# Patient Record
Sex: MALE | Race: WHITE | NOT HISPANIC OR LATINO | Employment: FULL TIME | URBAN - NONMETROPOLITAN AREA
[De-identification: names, ages, dates, MRNs, and addresses within clinical notes are randomized per-mention and may not be internally consistent; named-entity substitution may affect disease eponyms.]

---

## 2017-01-27 ENCOUNTER — APPOINTMENT (OUTPATIENT)
Dept: LAB | Facility: HOSPITAL | Age: 37
End: 2017-01-27
Attending: PHYSICIAN ASSISTANT

## 2017-01-27 PROCEDURE — 36415 COLL VENOUS BLD VENIPUNCTURE: CPT | Performed by: PHYSICIAN ASSISTANT

## 2017-01-27 PROCEDURE — 84443 ASSAY THYROID STIM HORMONE: CPT | Performed by: PHYSICIAN ASSISTANT

## 2017-05-12 ENCOUNTER — COMMUNICATION - GICH (OUTPATIENT)
Dept: FAMILY MEDICINE | Facility: OTHER | Age: 37
End: 2017-05-12

## 2017-06-26 ENCOUNTER — HISTORY (OUTPATIENT)
Dept: FAMILY MEDICINE | Facility: OTHER | Age: 37
End: 2017-06-26

## 2017-06-26 ENCOUNTER — OFFICE VISIT - GICH (OUTPATIENT)
Dept: FAMILY MEDICINE | Facility: OTHER | Age: 37
End: 2017-06-26

## 2017-06-26 DIAGNOSIS — L30.9 DERMATITIS: ICD-10-CM

## 2017-06-26 DIAGNOSIS — Z13.220 ENCOUNTER FOR SCREENING FOR LIPOID DISORDERS: ICD-10-CM

## 2017-06-26 DIAGNOSIS — E66.01 MORBID (SEVERE) OBESITY DUE TO EXCESS CALORIES (H): ICD-10-CM

## 2017-06-26 DIAGNOSIS — E31.22 MULTIPLE ENDOCRINE NEOPLASIA (MEN) TYPE IIA (H): ICD-10-CM

## 2017-06-26 DIAGNOSIS — C73 MALIGNANT NEOPLASM OF THYROID GLAND (H): ICD-10-CM

## 2017-06-26 DIAGNOSIS — K74.69 OTHER CIRRHOSIS OF LIVER (H): ICD-10-CM

## 2017-06-26 DIAGNOSIS — Z00.00 ENCOUNTER FOR GENERAL ADULT MEDICAL EXAMINATION WITHOUT ABNORMAL FINDINGS: ICD-10-CM

## 2017-06-26 LAB
ANION GAP - HISTORICAL: 6 (ref 5–18)
BUN SERPL-MCNC: 6 MG/DL (ref 7–25)
BUN/CREAT RATIO - HISTORICAL: 9
CALCIUM SERPL-MCNC: 8.4 MG/DL (ref 8.6–10.3)
CHLORIDE SERPLBLD-SCNC: 105 MMOL/L (ref 98–107)
CHOL/HDL RATIO - HISTORICAL: 3.72
CHOLESTEROL TOTAL: 108 MG/DL
CO2 SERPL-SCNC: 24 MMOL/L (ref 21–31)
CREAT SERPL-MCNC: 0.69 MG/DL (ref 0.7–1.3)
GFR IF NOT AFRICAN AMERICAN - HISTORICAL: >60 ML/MIN/1.73M2
GLUCOSE SERPL-MCNC: 348 MG/DL (ref 70–105)
HDLC SERPL-MCNC: 29 MG/DL (ref 23–92)
LDLC SERPL CALC-MCNC: 47 MG/DL
NON-HDL CHOLESTEROL - HISTORICAL: 79 MG/DL
PATIENT STATUS - HISTORICAL: ABNORMAL
POTASSIUM SERPL-SCNC: 3.8 MMOL/L (ref 3.5–5.1)
SODIUM SERPL-SCNC: 135 MMOL/L (ref 133–143)
TRIGL SERPL-MCNC: 162 MG/DL
TSH - HISTORICAL: 1.63 UIU/ML (ref 0.34–5.6)

## 2017-06-26 ASSESSMENT — ANXIETY QUESTIONNAIRES
3. WORRYING TOO MUCH ABOUT DIFFERENT THINGS: NOT AT ALL
4. TROUBLE RELAXING: NOT AT ALL
1. FEELING NERVOUS, ANXIOUS, OR ON EDGE: NOT AT ALL
GAD7 TOTAL SCORE: 0
7. FEELING AFRAID AS IF SOMETHING AWFUL MIGHT HAPPEN: NOT AT ALL
2. NOT BEING ABLE TO STOP OR CONTROL WORRYING: NOT AT ALL
6. BECOMING EASILY ANNOYED OR IRRITABLE: NOT AT ALL
5. BEING SO RESTLESS THAT IT IS HARD TO SIT STILL: NOT AT ALL

## 2017-06-26 ASSESSMENT — PATIENT HEALTH QUESTIONNAIRE - PHQ9: SUM OF ALL RESPONSES TO PHQ QUESTIONS 1-9: 0

## 2017-06-29 ENCOUNTER — OFFICE VISIT - GICH (OUTPATIENT)
Dept: FAMILY MEDICINE | Facility: OTHER | Age: 37
End: 2017-06-29

## 2017-06-29 ENCOUNTER — HISTORY (OUTPATIENT)
Dept: FAMILY MEDICINE | Facility: OTHER | Age: 37
End: 2017-06-29

## 2017-06-29 DIAGNOSIS — E11.9 TYPE 2 DIABETES MELLITUS WITHOUT COMPLICATIONS (H): ICD-10-CM

## 2017-06-29 LAB
CREATININE RANDOM URINE: 258.8 MG/DL (ref 63–166)
ESTIMATED AVERAGE GLUCOSE: 123 MG/DL
HEMOGLOBIN A1C MONITORING (POCT) - HISTORICAL: 5.9 % (ref 4–6.2)
PROT/CREAT RATIO,UR - HISTORICAL: 0.1
PROTEIN QUANT,RAND URINE - HISTORICAL: 21 MG/DL (ref 1–14)

## 2017-06-29 ASSESSMENT — PATIENT HEALTH QUESTIONNAIRE - PHQ9: SUM OF ALL RESPONSES TO PHQ QUESTIONS 1-9: 0

## 2017-07-18 ENCOUNTER — AMBULATORY - GICH (OUTPATIENT)
Dept: EDUCATION SERVICES | Facility: OTHER | Age: 37
End: 2017-07-18

## 2017-07-18 DIAGNOSIS — E11.9 TYPE 2 DIABETES MELLITUS WITHOUT COMPLICATIONS (H): ICD-10-CM

## 2017-07-19 ENCOUNTER — COMMUNICATION - GICH (OUTPATIENT)
Dept: FAMILY MEDICINE | Facility: OTHER | Age: 37
End: 2017-07-19

## 2017-07-19 DIAGNOSIS — E11.9 TYPE 2 DIABETES MELLITUS WITHOUT COMPLICATIONS (H): ICD-10-CM

## 2017-08-07 ENCOUNTER — HISTORY (OUTPATIENT)
Dept: EMERGENCY MEDICINE | Facility: OTHER | Age: 37
End: 2017-08-07

## 2017-09-17 ENCOUNTER — HISTORY (OUTPATIENT)
Dept: EMERGENCY MEDICINE | Facility: OTHER | Age: 37
End: 2017-09-17

## 2017-09-18 ENCOUNTER — AMBULATORY - GICH (OUTPATIENT)
Dept: SCHEDULING | Facility: OTHER | Age: 37
End: 2017-09-18

## 2017-09-19 ENCOUNTER — AMBULATORY - GICH (OUTPATIENT)
Dept: SCHEDULING | Facility: OTHER | Age: 37
End: 2017-09-19

## 2017-09-22 ENCOUNTER — COMMUNICATION - GICH (OUTPATIENT)
Dept: FAMILY MEDICINE | Facility: OTHER | Age: 37
End: 2017-09-22

## 2017-09-22 DIAGNOSIS — Z13.220 ENCOUNTER FOR SCREENING FOR LIPOID DISORDERS: ICD-10-CM

## 2017-09-22 DIAGNOSIS — E31.22 MULTIPLE ENDOCRINE NEOPLASIA (MEN) TYPE IIA (H): ICD-10-CM

## 2017-09-22 DIAGNOSIS — E11.9 TYPE 2 DIABETES MELLITUS WITHOUT COMPLICATIONS (H): ICD-10-CM

## 2017-10-17 ENCOUNTER — OFFICE VISIT - GICH (OUTPATIENT)
Dept: FAMILY MEDICINE | Facility: OTHER | Age: 37
End: 2017-10-17

## 2017-10-17 ENCOUNTER — HISTORY (OUTPATIENT)
Dept: FAMILY MEDICINE | Facility: OTHER | Age: 37
End: 2017-10-17

## 2017-10-17 DIAGNOSIS — B07.0 PLANTAR WART: ICD-10-CM

## 2017-10-23 ENCOUNTER — COMMUNICATION - GICH (OUTPATIENT)
Dept: FAMILY MEDICINE | Facility: OTHER | Age: 37
End: 2017-10-23

## 2017-10-23 DIAGNOSIS — E11.9 TYPE 2 DIABETES MELLITUS WITHOUT COMPLICATIONS (H): ICD-10-CM

## 2017-10-27 ENCOUNTER — COMMUNICATION - GICH (OUTPATIENT)
Dept: FAMILY MEDICINE | Facility: OTHER | Age: 37
End: 2017-10-27

## 2017-10-31 ENCOUNTER — AMBULATORY - GICH (OUTPATIENT)
Dept: SCHEDULING | Facility: OTHER | Age: 37
End: 2017-10-31

## 2017-11-03 ENCOUNTER — COMMUNICATION - GICH (OUTPATIENT)
Dept: LAB | Facility: OTHER | Age: 37
End: 2017-11-03

## 2017-11-03 DIAGNOSIS — E11.9 TYPE 2 DIABETES MELLITUS WITHOUT COMPLICATIONS (H): ICD-10-CM

## 2017-11-09 ENCOUNTER — AMBULATORY - GICH (OUTPATIENT)
Dept: SCHEDULING | Facility: OTHER | Age: 37
End: 2017-11-09

## 2017-11-21 ENCOUNTER — HISTORY (OUTPATIENT)
Dept: FAMILY MEDICINE | Facility: OTHER | Age: 37
End: 2017-11-21

## 2017-11-21 ENCOUNTER — OFFICE VISIT - GICH (OUTPATIENT)
Dept: FAMILY MEDICINE | Facility: OTHER | Age: 37
End: 2017-11-21

## 2017-11-21 DIAGNOSIS — B07.0 PLANTAR WART: ICD-10-CM

## 2017-12-27 NOTE — PROGRESS NOTES
Patient Information     Patient Name MRN Sex     Jason Suarez [id number] Male 1980      Progress Notes by Dg Silva MD at 10/17/2017  4:15 PM     Author:  Dg Silva MD Service:  (none) Author Type:  Physician     Filed:  10/17/2017  4:37 PM Encounter Date:  10/17/2017 Status:  Signed     :  Dg Silva MD (Physician)            SUBJECTIVE:    Jason Suarez is a 37 y.o. male who presents for plantar warts    HPI    Right foot with several warts for at least a year.  No previous treatment at all.  No pain unless he is on a long walk and shoes are too tight.  Intentionally losing weight.      No Known Allergies,   Current Outpatient Prescriptions on File Prior to Visit       Medication  Sig Dispense Refill     aspirin (ECOTRIN) 81 mg enteric coated tablet Take 1 tablet by mouth once daily with a meal.  0     cholecalciferol (VITAMIN D) 1,000 unit capsule Take 1,000 Units by mouth once daily.       FOLIC ACID/MULTIVIT-MIN/LUTEIN (CENTRUM SILVER ORAL) Take 1 Tab by mouth once daily.       levothyroxine (SYNTHROID) 175 mcg tablet Take 1 tablet by mouth before breakfast. 90 tablet 0     lisinopril (PRINIVIL; ZESTRIL) 10 mg tablet Take 1 tablet by mouth once daily. 90 tablet 3     metFORMIN (GLUCOPHAGE) 500 mg tablet TAKE 1 TABLET BY MOUTH TWICE DAILY WITH MEALS 60 tablet 0     triamcinolone 0.5% (ARISTOCORT) 0.5 % cream Apply  topically to affected area(s) 3 times daily. 30 g 2     vitamin e 400 unit capsule Take 800 Units by mouth once daily.       No current facility-administered medications on file prior to visit.    ,   Past Medical History:     Diagnosis  Date     Acute pancreatitis     H/O gallbladder pancreatitis      Bilateral arm fractures     Bilateral arm fractures as a child      Closed left ankle fracture      Esophageal reflux      Malignant neoplasm of thyroid gland (HC)      Multiple endocrine neoplasia (MEN) type IIA      Obesity, unspecified      Umbilical hernia  without mention of obstruction or gangrene     hernia repair 7/10/14     and   Past Surgical History:      Procedure  Laterality Date     ANKLE FRACTURE TREATMENT      ORIF lt ankle        LAP CHOLECYSTECTOMY  7/10/14    Cholecystectomy,Laparoscopic, liver biopsy, [facility] without mesh       REMOVE      and removal of syndesmotic screw.       THYROIDECTOMY      2010         REVIEW OF SYSTEMS:  Review of Systems   Constitutional: Positive for weight loss.   Skin: Negative for itching and rash.   Psychiatric/Behavioral: Negative for substance abuse.       OBJECTIVE:  /72  Resp 16  Wt (!) 159 kg (350 lb 9.6 oz)  BMI 47.55 kg/m2    EXAM:   Physical Exam   Constitutional: He is oriented to person, place, and time and well-developed, well-nourished, and in no distress. No distress.   Neurological: He is alert and oriented to person, place, and time.   Skin: He is not diaphoretic.   Right foot with verucous lesions, on lateral 5th toe, inferior distal 4 th toe, plantar 1st MTP.  Larges is 1.5 cm in diameter.  All treatment with 3 cycles of cryo.       ASSESSMENT/PLAN:    ICD-10-CM    1. Plantar wart B07.0 AL DESTROY BENIGN LESIONS UP TO 14 LESIONS        Plan:  Can try OTC compoundW, or follow up in 3 weeks rule out another round of cryo.    Dg Silva MD ....................  10/17/2017   4:37 PM

## 2017-12-28 NOTE — PROGRESS NOTES
Patient Information     Patient Name MRN Sex     Jason Suarez [id number] Male 1980      Progress Notes by Antonio Wolff MD at 2017  4:00 PM     Author:  Antonio Wolff MD Service:  (none) Author Type:  Physician     Filed:  2017  8:32 AM Encounter Date:  2017 Status:  Signed     :  Antonio Wolff MD (Physician)            SUBJECTIVE:    Jason Suarez is a 37 y.o. male who presents for new onset diabetes    HPI Comments: Patient arrives here with a recent blood sugar of over 300. He does not have a history of diabetes nor a diagnosis in the past.      No Known Allergies,   Family History       Problem   Relation Age of Onset     Cancer  Father      Thyroid cancer.     ,   Current Outpatient Prescriptions on File Prior to Visit       Medication  Sig Dispense Refill     levothyroxine (SYNTHROID) 175 mcg tablet Take 1 tablet by mouth once daily. 90 tablet 0     triamcinolone 0.5% (ARISTOCORT) 0.5 % cream Apply  topically to affected area(s) 3 times daily. 30 g 2     No current facility-administered medications on file prior to visit.    ,   Past Surgical History:      Procedure  Laterality Date     ANKLE FRACTURE TREATMENT      ORIF lt ankle        LAP CHOLECYSTECTOMY  7/10/14    Cholecystectomy,Laparoscopic, liver biopsy, [facility] without mesh       REMOVE      and removal of syndesmotic screw.       THYROIDECTOMY           ,   Social History      Substance Use Topics        Smoking status:  Former Smoker     Packs/day: 1.00     Years: 12.     Types: Cigarettes     Quit date: 2015     Smokeless tobacco:  Never Used     Alcohol use  No    and   Social History      Substance Use Topics        Smoking status:  Former Smoker     Packs/day: 1.00     Years: 12.     Types: Cigarettes     Quit date: 2015     Smokeless tobacco:  Never Used     Alcohol use  No       REVIEW OF SYSTEMS:  ROS    OBJECTIVE:  /90  Pulse 80  Wt (!) 169.4 kg (373 lb 6.4 oz)  BMI 50.64  kg/m2    EXAM:   Physical Exam   Constitutional:   Obese   HENT:   Head: Normocephalic.   Cardiovascular: Normal rate and regular rhythm.      Results for orders placed or performed in visit on 06/29/17       Hgb A1c       Result  Value Ref Range Status    HEMOGLOBIN A1C MONITORING (POCT) 5.9 4.0 - 6.2 % Final    ESTIMATED AVERAGE GLUCOSE  123 mg/dL Final   URINE MICROALBUMIN/CREATININE RATIO       Result  Value Ref Range Status    PROTEIN QUANT,RAND URINE 21 (H) 1 - 14 mg/dL Final    CREAT,RANDOM URINE 258.8 (H) 63.0 - 166.0 mg/dL Final    PROT/CREAT RATIO,UR       0.1  Final         ASSESSMENT/PLAN:    ICD-10-CM    1. Type 2 diabetes mellitus without complication, without long-term current use of insulin (HC) E11.9 metFORMIN (GLUCOPHAGE) 500 mg tablet      aspirin (ECOTRIN) 81 mg enteric coated tablet      lisinopril (PRINIVIL; ZESTRIL) 10 mg tablet      Hgb A1c      URINE MICROALBUMIN/CREATININE RATIO      AMB CONSULT TO DIABETES EDUCATION      Hgb A1c      URINE MICROALBUMIN/CREATININE RATIO        Plan:    Diagnoses diabetes.  I discussed the definition.  Discussed medications including lisinopril met metformin and aspirin. His LDL is excellent we will hold off on a statin. Patient does not wish to take it especially having an excellent recent LDL.  Also discussed the need for diabetic teaching blood sugar monitoring. He will get set up with our diabetic educator.  Greater than 50% of time discussing diabetes. Complications. Vacation. And the need for education. 25-30 minutes spent.

## 2017-12-28 NOTE — TELEPHONE ENCOUNTER
Patient Information     Patient Name MRN Sex Jason Clancy [id number] Male 1980      Telephone Encounter by Cynthia Sutherland RN at 2017  8:17 AM     Author:  Cynthia Sutherland RN Service:  (none) Author Type:  NURS- Registered Nurse     Filed:  2017  8:24 AM Encounter Date:  2017 Status:  Signed     :  Cynthia Sutherland RN (NURS- Registered Nurse)            Biguanides    Office visit in the past 12 months or per provider note.    Last visit with GELACIO MARAVILLA was on: 2017 in [facility]  Next visit with GELACIO MARAVILLA is on: No future appointment listed with this provider  Next visit with Family Practice is on: No future appointment listed in this department    Lab test requirements:  HgbA1c annually or per provider note.  HEMOGLOBIN A1C MONITORING (POCT) (%)    Date Value   2017 5.9       Max refill for 12 months from last office visit or per provider note.    Per diabetic education PT to have A1C in September.     Prescription refilled per RN Medication Refill Policy.................... Cynthia Sutherland RN ....................  2017   8:21 AM

## 2017-12-28 NOTE — TELEPHONE ENCOUNTER
Patient Information     Patient Name MRN Sex Jason Clancy [id number] Male 1980      Telephone Encounter by Soledad Callejas at 11/3/2017  9:42 AM     Author:  Soledad Callejas Service:  (none) Author Type:  (none)     Filed:  11/3/2017  9:42 AM Encounter Date:  11/3/2017 Status:  Signed     :  Soledad Callejas            Patient is coming for labs. Please place orders.

## 2017-12-28 NOTE — PROGRESS NOTES
Patient Information     Patient Name MRN Sex     Jason Suarez [id number] Male 1980      Progress Notes by Dg Silva MD at 2017  4:15 PM     Author:  Dg Silva MD Service:  (none) Author Type:  Physician     Filed:  2017  4:30 PM Encounter Date:  2017 Status:  Signed     :  Dg Silva MD (Physician)            SUBJECTIVE:    Jason Suarez is a 37 y.o. male who presents for follow up plantar wart treatment     HPI    Last treatment only slightly helped.  Has no pain from them.  Would like another round.    No Known Allergies,   Current Outpatient Prescriptions on File Prior to Visit       Medication  Sig Dispense Refill     aspirin (ECOTRIN) 81 mg enteric coated tablet Take 1 tablet by mouth once daily with a meal.  0     cholecalciferol (VITAMIN D) 1,000 unit capsule Take 1,000 Units by mouth once daily.       FOLIC ACID/MULTIVIT-MIN/LUTEIN (CENTRUM SILVER ORAL) Take 1 Tab by mouth once daily.       furosemide (LASIX) 20 mg tablet Take 20 mg by mouth once daily.       lisinopril (PRINIVIL; ZESTRIL) 10 mg tablet Take 1 tablet by mouth once daily. 90 tablet 3     metFORMIN (GLUCOPHAGE) 500 mg tablet TAKE 1 TABLET BY MOUTH TWICE DAILY WITH MEALS 60 tablet 0     triamcinolone 0.5% (ARISTOCORT) 0.5 % cream Apply  topically to affected area(s) 3 times daily. 30 g 2     vitamin e 400 unit capsule Take 800 Units by mouth once daily.       No current facility-administered medications on file prior to visit.    ,   Past Medical History:     Diagnosis  Date     Acute pancreatitis     H/O gallbladder pancreatitis      Bilateral arm fractures     Bilateral arm fractures as a child      Closed left ankle fracture      Esophageal reflux      Malignant neoplasm of thyroid gland (HC)      Multiple endocrine neoplasia (MEN) type IIA      Obesity, unspecified      Umbilical hernia without mention of obstruction or gangrene     hernia repair 7/10/14     and   Past Surgical History:       Procedure  Laterality Date     ANKLE FRACTURE TREATMENT      ORIF lt ankle        LAP CHOLECYSTECTOMY  7/10/14    Cholecystectomy,Laparoscopic, liver biopsy, [facility] without mesh       REMOVE      and removal of syndesmotic screw.       THYROIDECTOMY      2010         REVIEW OF SYSTEMS:  Review of Systems   Skin: Negative for itching and rash.       OBJECTIVE:  /60  Pulse 84  Wt (!) 161.5 kg (356 lb)  BMI 48.28 kg/m2    EXAM:   Physical Exam   Constitutional: He is oriented to person, place, and time and well-developed, well-nourished, and in no distress. No distress.   Neurological: He is alert and oriented to person, place, and time.   Skin: He is not diaphoretic.   Right foot with many, perhaps 1-, verrucous lesions from lateral 5th toe to plantar 1st toe.  Largest is about 7 mm or so, smallest 2 mm.  All treated with 3 cycles of cryo.       ASSESSMENT/PLAN:    ICD-10-CM    1. Plantar wart B07.0 VA DESTROY BENIGN LESIONS UP TO 14 LESIONS        Plan:  Can repeat this again in 3 or more weeks.  If not responding, either can live with the symptoms, use OTC topical treatment or consider candida injections.    Dg Silva MD ....................  11/21/2017   4:29 PM

## 2017-12-28 NOTE — TELEPHONE ENCOUNTER
Patient Information     Patient Name MRN Jason Merida [id number] Male 1980      Telephone Encounter by Tyesha Gudino LPN at 11/3/2017 10:01 AM     Author:  Tyesha Gudino LPN Service:  (none) Author Type:  NURS- Licensed Practical Nurse     Filed:  11/3/2017 10:03 AM Encounter Date:  11/3/2017 Status:  Signed     :  Tyesha Gudino LPN (NURS- Licensed Practical Nurse)            Patient is scheduled to come in for a diabetic appointment, please place lab orders.  Tyesha Gudino LPN.........11/3/2017   10:01 AM

## 2017-12-28 NOTE — TELEPHONE ENCOUNTER
Patient Information     Patient Name MRN Jason Merida [id number] Male 1980      Telephone Encounter by Ileana Kee at 10/27/2017  2:13 PM     Author:  Ileana Kee Service:  (none) Author Type:  (none)     Filed:  10/27/2017  3:07 PM Encounter Date:  10/27/2017 Status:  Signed     :  Ileana Kee            Left message with patient. Attempting to find out if patient knows why he is coming in for labs. Previous visits don't indicate a lab apt and PCP is unavailable.  Ileana Kee LPN .............10/27/2017  3:07 PM

## 2017-12-28 NOTE — PROGRESS NOTES
Patient Information     Patient Name MRN Sex     Jason Suarez [id number] Male 1980      Progress Notes by Faith Mcclelland RN at 2017  2:30 PM     Author:  Faith Mcclelland RN Service:  (none) Author Type:  NURS- Registered Nurse     Filed:  2017  4:37 PM Encounter Date:  2017 Status:  Signed     :  Faith Mcclelland RN (NURS- Registered Nurse)            Diabetes Education Progress Note - Individual Education    Referring Medical Provider:  Dr. oWlff    SUBJECTIVE: Jason Suarez is a 37 y.o. male who has diabetes with and is here for Individual Diabetes Education.   Age at diagnosis 37. Family history positive for diabetes in the patient's Mother and Father.   Current treatment includes diet and oral agent.   Current monitoring regimen: none      Individual Assessed Needs Include:  Diabetes Disease Process, Overview of DM, Incorporating Nutrition Management into Lifestyle, Incorporating Physical Activity into Lifestyle, Using Diabetes Medications Safely, Glucose Monitoring and Interpreting and Using Results, Prevention, detection, and treatment of acute complications, Prevention, detection, and treatment of Chronic Complications, Developing Strategies to address Psychosocial Issues and Developing Strategies to promote Health/Change Behavior      Education included: Diabetes Disease Process, Nutrition/Carbohydrate counting, Physical Activity, Diabetes Medications, Hyperglycemia/Hypoglycemia, Chronic Complications, Optimal Diabetes Care, Overview of DM, Psychosocial Strategies and Health/Change Behavior Strategies    Discussed D5 Health Goals and patient has met 5 of 5 goals at this time.  (BP less than 140/90, ASA therapy as recommended, statin therapy as recommended, A1c less than 8%, tobacco free).    Patient does not want to begin checking his BG at this time.  He would like to try meal planning and physical activity, with weight loss to help tighten BG control.  He asks to wait  for follow up HgA1c and FBG lab report before beginning SMBG.        Carbohydrate counting overview and practice. Introduced patient to physical activity and goal setting. Patient's current physical activity habits vary, no regular activity time.  Discussed participation in Diabetes Prevention Program which can aid patient in weight loss.  Contact information given.    Plan Breakfast Snack Lunch Snack Dinner Snack   CHO grams 60  0 - 15 60 0 - 15 60 0 - 15    Meat  5 - 6 oz/day  Fats  4 - 6 servings/day  Vegetables Unlimited   servings/day          Educational Handouts Given:  Diabetes Success Plan, My Food Plan, A1c flier, Activity Pyramid, D5 Health Goals    Patient did verbalize understanding of education as evidenced by stating need to begin meal planning, counting CHO for weight loss and tighter control of BG.     Patient  did demonstrate understanding of education today as evidenced by goal setting.         PLAN:  Patient requests to wait to learn BG testing until after his follow up A1c in September.  Encouraged patient to call if he changes his mind and would like to begin SMBG.  Patient will follow meal plan, practice carbohydrate counting and label reading.  Patient encouraged to develop physical activity plan.  Patient will follow up with provider as directed by PCP    Self-Directed Behavior Goal/s set by patient:  (1) Count carbohydrates at most of my meals and snacks.         Time spent for individual education was 90 minutes.    Faith Mcclelland RN, BSN, CDE  7/18/2017 4:20 PM       DIABETES SELF-MANAGEMENT SUPPORT (DSMS) PLAN    The patient has attended diabetes education sessions. The patient has received a copy of the below DSMS and has chosen to use the following as resources to help manage their diabetes.     DSMS Plan:  (1) Check out apps/web based programs.         The following are resources that will help you manage your diabetes.   Let your educator know if you need help accessing the  websites.    Emotional Support  Diabetes Support Group: Sponsored by [facility]    Meets the 4th Thursday of the month at 6:30    [facility] [place], [address] [place] [address] [place]  Other: ______________________________________________________________________  Weight Management   Weight Watchers     Meets Mondays 9:30, 11:45, or 5:30    Larry Mack, [address] [facility] [place]     Contact Daniella [phone] [email or web address]    Weight Watchers www. weightwatchers.com    My Fitness Pal    Rapp IT Up.Snapguide or download the from [place]iss[place]  Other: ______________________________________________________________________    Exercise   [address]  [phone]  Walking/Biking Trails    Get Fit [place]  ImageBrieffitHASHa.[email or web address]  Other: ______________________________________________________________________    Stress Relief    [facility], [address] [facility], [phone]    [facility], [address] [place] [address] [phone]  Other: ______________________________________________________________________  Journals     Diabetes Forecast- 922.332.4096- www.diabetesforecast.org     Diabetes Self-Management- [phone]- www.diabetesselfmanagement.com     Apps/Web Based Programs    My Fitness Pal  myfitnesspal.com    Glucose [place] glucosebuddy.com  (Free, tracks blood glucose, graphs)     Emeals  emeals.com  (weekly meal plans)    Fooducate.com   (for iphone, ipad, ipod touch, android, & web)    CalorieKing.com  (for iphone, ipad, ipod touch, android, blackberry, & web)    SparkPeople.com  (free tools, resources & support for weight loss, calorie counter, fitness              plans & videos, recipes, etc)    Signed: Patient Name: ________________________________ Date________________________   Communicated to referring provider: _______________________________

## 2017-12-28 NOTE — PATIENT INSTRUCTIONS
Patient Information     Patient Name MRN Jason Merida [id number] Male 1980      Patient Instructions by Faith Mcclelland RN at 2017  2:30 PM     Author:  Faith Mcclelland RN Service:  (none) Author Type:  NURS- Registered Nurse     Filed:  2017  4:08 PM Encounter Date:  2017 Status:  Signed     :  Faith Mcclelland RN (NURS- Registered Nurse)            Diabetes Goals and Reminders  Your A1c test should be done every 3 months.  Your goal is less than 7%.   Your last result is:  HEMOGLOBIN A1C MONITORING (POCT) (%)    Date Value   2017 5.9       Your LDL cholesterol test should be done at least once a year.    Your last result is:  LDL CHOLESTEROL (mg/dL)    Date Value   2017 47       Have Blood pressure and weight checked every three months.  Your blood pressure goal is 140/90 or less.  Your last blood pressure reading was: 130/72      Avoid all tobacco.  Follow your healthy diet and exercise plan.  See the eye doctor every year.  See the dentist every six months.  Have kidney function tested yearly.    Take all medicine as prescribed.  Please let me know if you are having symptoms you don t expect or if you wish to stop any medicine.    Follow Up Plan  Please call or visit Diabetes Education if blood sugars are consistently out of target.  Your future lab plan is:  HgA1c after .    If you need your cholesterol checked at your next appointment, you should fast 8 to 10 hours before your appointment.  Do not eat or drink anything besides water.  Drink plenty of water and take all your usual medicine.    SUMMARY FOR TODAY'S VISIT      1.  Begin carbohydrate counting, 4 choices per meal, 0 - 1 choice per snack (limiting snacks to help with weight loss and tighter blood sugar control).     2.  Recommend low to moderate exercise, such as walking, working up to a minimum of 30 minutes most days, as tolerated.     3.  Follow-up for continued diabetes  education, as needed.  Consider attending the Diabetes Prevention Program at the [facility].  Please call Kristin Klinefelter, Nutritionist for more information [phone].      Faith Mcclelland RN, BSN, CDE  7/18/2017 4:02 PM

## 2017-12-28 NOTE — TELEPHONE ENCOUNTER
Patient Information     Patient Name MRN Sex Jason Clancy [id number] Male 1980      Telephone Encounter by Erin Welsh RN at 2017  2:17 PM     Author:  Erin Welsh RN Service:  (none) Author Type:  NURS- Registered Nurse     Filed:  2017  2:25 PM Encounter Date:  2017 Status:  Signed     :  Erin Welsh RN (NURS- Registered Nurse)            Biguanides    Office visit in the past 12 months or per provider note.    Last visit with GELACIO MARAVILLA was on: 2017 in [facility]  Next visit with GELACIO MARAVILLA is on: No future appointment listed with this provider  Next visit with Family Practice is on: No future appointment listed in this department    Lab test requirements:  HgbA1c annually or per provider note.  HEMOGLOBIN A1C MONITORING (POCT) (%)    Date Value   2017 5.9       Max refill for 12 months from last office visit or per provider note.    If taking for polycystic ovary disease, may refill for 12 months.    Per diabetic education patient to have A1C in September. No upcoming appointment noted. Limited refill provided. Latter and MyChart message sent.    Erin Welsh RN........2017 2:23 PM

## 2017-12-28 NOTE — TELEPHONE ENCOUNTER
Patient Information     Patient Name MRN Jason Merida [id number] Male 1980      Telephone Encounter by Estrella Dinh at 10/27/2017  1:27 PM     Author:  Estrella Dinh Service:  (none) Author Type:  (none)     Filed:  10/27/2017  1:28 PM Encounter Date:  10/27/2017 Status:  Signed     :  Estrella Dinh            This patient is coming for lab on , please place order  Thank you

## 2017-12-28 NOTE — TELEPHONE ENCOUNTER
Patient Information     Patient Name MRN Sex Jason Clancy [id number] Male 1980      Telephone Encounter by Michelle Celaya at 10/31/2017  9:48 AM     Author:  Michelle Celaya Service:  (none) Author Type:  (none)     Filed:  10/31/2017  9:50 AM Encounter Date:  10/27/2017 Status:  Signed     :  Michelle Celaya            Patient is scheduled with diabetic ed on  and then schedule for lab following. Last A1C was in . Michelle Celaya LPN .......................10/31/2017  9:50 AM

## 2017-12-28 NOTE — TELEPHONE ENCOUNTER
Patient Information     Patient Name MRN Sex Jason Clancy [id number] Male 1980      Telephone Encounter by Erin Welsh RN at 10/25/2017  3:24 PM     Author:  Erin Welsh RN Service:  (none) Author Type:  NURS- Registered Nurse     Filed:  10/25/2017  3:34 PM Encounter Date:  10/23/2017 Status:  Signed     :  Erin Welsh RN (NURS- Registered Nurse)            Biguanides    Office visit in the past 12 months or per provider note.    Last visit with GELACIO MARAVILLA was on: 2017 in [facility]  Next visit with GELACIO MARAVILLA is on: No future appointment listed with this provider  Next visit with Family Practice is on: 2017 in [place] [facility]    Lab test requirements:  HgbA1c annually or per provider note.  HEMOGLOBIN A1C MONITORING (POCT) (%)    Date Value   2017 5.9       Max refill for 12 months from last office visit or per provider note.    If taking for polycystic ovary disease, may refill for 12 months.    Patient was advised to follow up with diabetes education for repeat A1c in September. This was not completed. Patient was called today and transferred to scheduling to make this appointment..    Prescription refilled per RN Medication Refill Policy.................... Erin Welsh RN ....................  10/25/2017   3:34 PM

## 2017-12-29 ENCOUNTER — HOSPITAL ENCOUNTER (EMERGENCY)
Facility: HOSPITAL | Age: 37
Discharge: HOME | End: 2017-12-29
Attending: PHYSICIAN ASSISTANT | Admitting: PHYSICIAN ASSISTANT
Payer: COMMERCIAL

## 2017-12-29 VITALS
SYSTOLIC BLOOD PRESSURE: 150 MMHG | RESPIRATION RATE: 20 BRPM | WEIGHT: 315 LBS | OXYGEN SATURATION: 98 % | TEMPERATURE: 97.1 F | HEART RATE: 90 BPM | DIASTOLIC BLOOD PRESSURE: 74 MMHG

## 2017-12-29 DIAGNOSIS — K04.7 DENTAL INFECTION: ICD-10-CM

## 2017-12-29 PROCEDURE — 99213 OFFICE O/P EST LOW 20 MIN: CPT

## 2017-12-29 PROCEDURE — 99202 OFFICE O/P NEW SF 15 MIN: CPT | Performed by: PHYSICIAN ASSISTANT

## 2017-12-29 RX ORDER — AMOXICILLIN 500 MG/1
500 CAPSULE ORAL 3 TIMES DAILY
Qty: 30 CAPSULE | Refills: 0 | Status: SHIPPED | OUTPATIENT
Start: 2017-12-29 | End: 2017-12-29

## 2017-12-29 RX ORDER — PREDNISONE 20 MG/1
40 TABLET ORAL DAILY
Qty: 4 TABLET | Refills: 0 | Status: SHIPPED | OUTPATIENT
Start: 2017-12-29 | End: 2018-07-16

## 2017-12-29 RX ORDER — AMOXICILLIN 500 MG/1
500 CAPSULE ORAL 3 TIMES DAILY
Qty: 30 CAPSULE | Refills: 0 | Status: SHIPPED | OUTPATIENT
Start: 2017-12-29 | End: 2018-07-16

## 2017-12-29 RX ORDER — PREDNISONE 20 MG/1
40 TABLET ORAL DAILY
Qty: 4 TABLET | Refills: 0 | Status: SHIPPED | OUTPATIENT
Start: 2017-12-29 | End: 2017-12-29

## 2017-12-29 ASSESSMENT — ENCOUNTER SYMPTOMS
CARDIOVASCULAR NEGATIVE: 1
PSYCHIATRIC NEGATIVE: 1
CHILLS: 0
NEUROLOGICAL NEGATIVE: 1
NAUSEA: 0
ABDOMINAL PAIN: 0
RESPIRATORY NEGATIVE: 1
FACIAL SWELLING: 1
FATIGUE: 0
FEVER: 0
SINUS PRESSURE: 0

## 2017-12-29 NOTE — ED AVS SNAPSHOT
[facility]    [address] [address]    Phone:  [phone]                                       Jason Suarez   MRN: [id number]    Department:  [facility]   Date of Visit:  12/29/2017           Patient Information     Date Of Birth          1980        Your diagnoses for this visit were:     Dental infection        You were seen by Kenny Dubois PA.      Follow-up Information     Please follow up.    Why:  dentist as planned        Follow up with [facility].    Specialty:  EMERGENCY MEDICINE    Why:  If symptoms worsen    Contact information:    [address] [place] [address] [phone]    Additional information:    From [facility]: Take [address]. Turn left at [address]. Turn left at the first stoplight on [address]. At the first stop sign, take a right onto [address]. Take a left into the parking lot and continue through until you reach the North enterance of the building.       From [place]: Take [address]. Take the [address] ramp towards [place]. Turn left onto [address]. Take a slight right onto [address]. Turn left at the first stoplight on [address]. At the first stop sign, take a right onto [address]. Take a left into the parking lot and continue through until you reach the North enterance of the building.       From [place]: Take [address]. Take a right at [address]. At the first stop sign, take a right onto [address]. Take a left into the parking lot and continue through until you reach the North enterance of the building.         Discharge Instructions       - 1000mg Amoxicillin (loading dose), then as directed.   - I would also take 2 tabs (40mg) prednisone for pain - may take a bit to kick in, but very helpful.   * Keep appt with dentist - back here with worsening, facial redness, fevers.     Discharge  References/Attachments     ABSCESS, DENTAL (ENGLISH)         Review of your medicines      START taking        Dose / Directions Last dose taken    amoxicillin 500 MG capsule   Commonly known as:  AMOXIL   Dose:  500 mg   Quantity:  30 capsule        Take 1 capsule (500 mg) by mouth 3 times daily for 10 days   Refills:  0        predniSONE 20 MG tablet   Commonly known as:  DELTASONE   Dose:  40 mg   Quantity:  4 tablet        Take 2 tablets (40 mg) by mouth daily for 2 days   Refills:  0          Our records show that you are taking the medicines listed below. If these are incorrect, please call your family doctor or clinic.        Dose / Directions Last dose taken    IBUPROFEN PO   Dose:  800 mg        Take 800 mg by mouth every 4 hours as needed for moderate pain   Refills:  0        LASIX PO        Refills:  0        LEVOTHROID PO   Dose:  135 mg        Take 135 mg by mouth daily   Refills:  0                Prescriptions were sent or printed at these locations (2 Prescriptions)                   [facility]  [place], [place] - [address] [facility] [address]   [address] [facility] [address] [place] [address]    Telephone:  [phone]   Fax:  [phone]   Hours:                  E-Prescribed (2 of 2)         predniSONE (DELTASONE) 20 MG tablet               amoxicillin (AMOXIL) 500 MG capsule                Orders Needing Specimen Collection     None      Pending Results     No orders found from 12/27/2017 to 12/30/2017.            Pending Culture Results     No orders found from 12/27/2017 to 12/30/2017.            Thank you for choosing [facility]       Thank you for choosing [facility] for your care. Our goal is always to provide you with excellent care. Hearing back from our patients is one way we can continue to improve our services. Please take a few minutes to complete the written survey that you may receive in the mail after you visit with us. Thank you!        [employer] Information     [employer] lets you send messages to your doctor,  "view your test results, renew your prescriptions, schedule appointments and more. To sign up, go to [email or web address] . Click on \"Log in\" on the left side of the screen, which will take you to the Welcome page. Then click on \"Sign up Now\" on the right side of the page.     You will be asked to enter the access code listed below, as well as some personal information. Please follow the directions to create your username and password.     Your access code is: [email or web address]  Expires: 3/29/2018 10:18 AM     Your access code will  in 90 days. If you need help or a new code, please call your [facility] clinic or [phone].        Care EveryWhere ID     This is your Care EveryWhere ID. This could be used by other organizations to access your [facility] medical records  [id number]        Equal Access to Services     [facility] : Jose Rich, di tesfaye, alyla prescott, ronda grullon . So [facility] [phone].    ATENCIÓN: Si habla español, tiene a casillas disposición servicios gratuitos de asistencia lingüística. Llame al [phone].    We comply with applicable federal civil rights laws and [place] laws. We do not discriminate on the basis of race, color, national origin, age, disability, sex, sexual orientation, or gender identity.            After Visit Summary       This is your record. Keep this with you and show to your community pharmacist(s) and doctor(s) at your next visit.                  "

## 2017-12-29 NOTE — ED NOTES
Pt reports chin and dental pain that started 2 days ago, his dentist is not in until 1/2/18. Motrin 800 mg at 0600.

## 2017-12-29 NOTE — DISCHARGE INSTRUCTIONS
- 1000mg Amoxicillin (loading dose), then as directed.   - I would also take 2 tabs (40mg) prednisone for pain - may take a bit to kick in, but very helpful.   * Keep appt with dentist - back here with worsening, facial redness, fevers.

## 2017-12-29 NOTE — ED AVS SNAPSHOT
[facility]    [address] [address]    Phone:  [phone]                                       Jason Suarez   MRN: [id number]    Department[facility]  [facility]   Date of Visit:  12/29/2017           After Visit Summary Signature Page     I have received my discharge instructions, and my questions have been answered. I have discussed any challenges I see with this plan with the nurse or doctor.    ..........................................................................................................................................  Patient/Patient Representative Signature      ..........................................................................................................................................  Patient Representative Print Name and Relationship to Patient    ..................................................               ................................................  Date                                            Time    ..........................................................................................................................................  Reviewed by Signature/Title    ...................................................              ..............................................  Date                                                            Time

## 2017-12-29 NOTE — ED PROVIDER NOTES
History     Chief Complaint   Patient presents with     Dental Pain     HPI  Jason Suarez is a 37 year old male who presents with dental pain. Left lower tooth pain started 3 days ago. It has worsened and now some swelling. He tired 4 different dentists today, but they were either out for holiday or not taking new patients. No trauma. No fever.    Problem List:    There are no active problems to display for this patient.       Past Medical History:    No past medical history on file.    Past Surgical History:    No past surgical history on file.    Family History:    No family history on file.    Social History:  Marital Status:   [2]  Social History   Substance Use Topics     Smoking status: Not on file     Smokeless tobacco: Not on file     Alcohol use Not on file        Medications:      IBUPROFEN PO   Levothyroxine Sodium (LEVOTHROID PO)   Furosemide (LASIX PO)   predniSONE (DELTASONE) 20 MG tablet   amoxicillin (AMOXIL) 500 MG capsule         Review of Systems   Constitutional: Negative for chills, fatigue and fever.   HENT: Positive for dental problem and facial swelling. Negative for drooling, ear pain and sinus pressure.    Respiratory: Negative.    Cardiovascular: Negative.    Gastrointestinal: Negative for abdominal pain and nausea.   Skin: Negative.    Neurological: Negative.    Psychiatric/Behavioral: Negative.        Physical Exam   BP: 150/74  Pulse: 90  Temp: 97.1  F (36.2  C)  Resp: 20  Weight: (!) 163.7 kg (361 lb)  SpO2: 98 %      Physical Exam   Constitutional: He is oriented to person, place, and time. He appears well-developed and well-nourished. No distress.   HENT:   Head: Atraumatic.   Right Ear: External ear normal.   Left Ear: External ear normal.   Nose: Nose normal.   Mouth/Throat: Oropharynx is clear and moist. Normal dentition. Dental abscesses (facial swelling, no fluctuant abscess of mucosa) present.       Eyes: Conjunctivae and EOM are normal.   Neck: Normal range of  motion. Neck supple.   Cardiovascular: Normal rate.    Pulmonary/Chest: Effort normal.   Neurological: He is alert and oriented to person, place, and time.   Skin: Skin is warm and dry.   Psychiatric: He has a normal mood and affect.   Nursing note and vitals reviewed.      ED Course     ED Course     Procedures      Assessments & Plan (with Medical Decision Making)     I have reviewed the nursing notes.  I have reviewed the findings, diagnosis, plan and need for follow up with the patient.    Discharge Medication List as of 12/29/2017 10:18 AM      START taking these medications    Details   predniSONE (DELTASONE) 20 MG tablet Take 2 tablets (40 mg) by mouth daily for 2 days, Disp-4 tablet, R-0, E-Prescribe      amoxicillin (AMOXIL) 500 MG capsule Take 1 capsule (500 mg) by mouth 3 times daily for 10 days, Disp-30 capsule, R-0, E-Prescribe             Final diagnoses:   Dental infection   Will treat as above. Pt will keep dentist appt Tuesday, returning here sooner with concern for worsening. Patient verbally educated and given appropriate education sheets for each of the diagnoses and has no questions.    Kenny Dubois PA-C   12/29/2017   11:05 AM    12/29/2017   [facility]     Kenny Dubois PA  12/29/17 0832

## 2017-12-30 NOTE — NURSING NOTE
Patient Information     Patient Name MRN Jason Merida [id number] Male 1980      Nursing Note by Michelle Celaya at 2017  4:15 PM     Author:  Michelle Cleaya Service:  (none) Author Type:  (none)     Filed:  2017  4:29 PM Encounter Date:  2017 Status:  Signed     :  Michelle Celaya            Patient here for physical, last eye and dental exams longer than  2 years. Tdap . Left lower leg rash has been there a month. Itches. Michelle Celaya LPN .......................2017  4:25 PM

## 2017-12-30 NOTE — NURSING NOTE
Patient Information     Patient Name MRN Jason Merida [id number] Male 1980      Nursing Note by Michelle Celaya at 2017  4:00 PM     Author:  Michelle Celaya Service:  (none) Author Type:  (none)     Filed:  2017  4:20 PM Encounter Date:  2017 Status:  Signed     :  Michelle Celaya            Patient here for diabetes. Michelle Celaya LPN .......................2017  4:15 PM

## 2017-12-30 NOTE — NURSING NOTE
Patient Information     Patient Name MRN Sex Jason Clancy [id number] Male 1980      Nursing Note by Cielo Ray at 10/17/2017  4:15 PM     Author:  Cielo Ray  Service:  (none) Author Type:  (none)     Filed:  10/17/2017  4:20 PM  Encounter Date:  10/17/2017 Status:  Addendum     :  Cielo Ray        Related Notes: Original Note by Cielo Ray filed at 10/17/2017  4:16 PM              Coming in to have warts frozen off his Rt foot 5th digit  Cielo Ray ....................  10/17/2017   4:20 PM

## 2017-12-30 NOTE — NURSING NOTE
Patient Information     Patient Name MRN Jason Merida [id number] Male 1980      Nursing Note by Mame Chapman at 2017  4:15 PM     Author:  Mame Chapman Service:  (none) Author Type:  (none)     Filed:  2017  4:24 PM Encounter Date:  2017 Status:  Signed     :  Mame Chapman            Patient is here today for his second wart treatment on his right foot. Mame Chapman LPN......................2017 4:11 PM

## 2018-01-05 NOTE — TELEPHONE ENCOUNTER
Patient Information     Patient Name MRN Jason Merida [id number] Male 1980      Telephone Encounter by Yolanda Wise RN at 2017  3:33 PM     Author:  Yolanda Wise RN Service:  (none) Author Type:  (none)     Filed:  2017  3:36 PM Encounter Date:  2017 Status:  Signed     :  Yolanda Wise RN (NURS- Registered Nurse)            Hypothyroidism    Office visit in the past 12 months or per provider note.    Last visit with ANTONIO MARAVILLA was on: 2015 in [facility]  Next visit with ANTONIO MARAVILLA is on: No future appointment listed with this provider  Next visit with Family Practice is on: No future appointment listed in this department    Lab testing requirements:  TSH annually  TSH (uIU/mL)    Date Value   2015 1.00       Max refill for 12 months from last office visit or per provider note.    [facility] informed that patient no longer see Antonio Maravilla MD for care, will fax to Jazmin Cr who is currently seeing patient.    Unable to complete prescription refill per RN Medication Refill Policy.................... Yolanda Wise ....................  2017   3:36 PM

## 2018-01-11 ENCOUNTER — HOSPITAL ENCOUNTER (EMERGENCY)
Facility: HOSPITAL | Age: 38
Discharge: HOME | End: 2018-01-11
Attending: PHYSICIAN ASSISTANT | Admitting: PHYSICIAN ASSISTANT
Payer: COMMERCIAL

## 2018-01-11 VITALS
TEMPERATURE: 98.3 F | RESPIRATION RATE: 20 BRPM | SYSTOLIC BLOOD PRESSURE: 157 MMHG | OXYGEN SATURATION: 98 % | HEART RATE: 91 BPM | DIASTOLIC BLOOD PRESSURE: 70 MMHG

## 2018-01-11 DIAGNOSIS — A04.72 C. DIFFICILE DIARRHEA: ICD-10-CM

## 2018-01-11 PROBLEM — K21.9 ESOPHAGEAL REFLUX: Status: ACTIVE | Noted: 2018-01-11

## 2018-01-11 PROBLEM — L30.9 DERMATITIS: Status: ACTIVE | Noted: 2017-06-26

## 2018-01-11 LAB
ALBUMIN SERPL-MCNC: 2.7 G/DL (ref 3.4–5)
ALBUMIN UR-MCNC: NEGATIVE MG/DL
ALP SERPL-CCNC: 117 U/L (ref 40–150)
ALT SERPL W P-5'-P-CCNC: 43 U/L (ref 0–70)
ANION GAP SERPL CALCULATED.3IONS-SCNC: 7 MMOL/L (ref 3–14)
APPEARANCE UR: CLEAR
AST SERPL W P-5'-P-CCNC: 40 U/L (ref 0–45)
BASOPHILS # BLD AUTO: 0 10E9/L (ref 0–0.2)
BASOPHILS NFR BLD AUTO: 0.4 %
BILIRUB SERPL-MCNC: 2.4 MG/DL (ref 0.2–1.3)
BILIRUB UR QL STRIP: NEGATIVE
BUN SERPL-MCNC: 9 MG/DL (ref 7–30)
C DIFF TOX B STL QL: POSITIVE
CALCIUM SERPL-MCNC: 7.8 MG/DL (ref 8.5–10.1)
CHLORIDE SERPL-SCNC: 109 MMOL/L (ref 94–109)
CO2 SERPL-SCNC: 24 MMOL/L (ref 20–32)
COLOR UR AUTO: NORMAL
CREAT SERPL-MCNC: 0.64 MG/DL (ref 0.66–1.25)
DIFFERENTIAL METHOD BLD: ABNORMAL
EOSINOPHIL # BLD AUTO: 0.2 10E9/L (ref 0–0.7)
EOSINOPHIL NFR BLD AUTO: 2.1 %
ERYTHROCYTE [DISTWIDTH] IN BLOOD BY AUTOMATED COUNT: 14.2 % (ref 10–15)
G LAMBLIA+CRYPTOSP AG STL QL IA: NORMAL
G LAMBLIA+CRYPTOSP AG STL QL IA: NORMAL
GFR SERPL CREATININE-BSD FRML MDRD: >90 ML/MIN/1.7M2
GLUCOSE SERPL-MCNC: 175 MG/DL (ref 70–99)
GLUCOSE UR STRIP-MCNC: NEGATIVE MG/DL
HCT VFR BLD AUTO: 36 % (ref 40–53)
HGB BLD-MCNC: 13.2 G/DL (ref 13.3–17.7)
HGB UR QL STRIP: NEGATIVE
IMM GRANULOCYTES # BLD: 0 10E9/L (ref 0–0.4)
IMM GRANULOCYTES NFR BLD: 0.5 %
KETONES UR STRIP-MCNC: NEGATIVE MG/DL
LEUKOCYTE ESTERASE UR QL STRIP: NEGATIVE
LYMPHOCYTES # BLD AUTO: 0.8 10E9/L (ref 0.8–5.3)
LYMPHOCYTES NFR BLD AUTO: 10.1 %
MCH RBC QN AUTO: 32.3 PG (ref 26.5–33)
MCHC RBC AUTO-ENTMCNC: 36.7 G/DL (ref 31.5–36.5)
MCV RBC AUTO: 88 FL (ref 78–100)
MONOCYTES # BLD AUTO: 0.8 10E9/L (ref 0–1.3)
MONOCYTES NFR BLD AUTO: 10.6 %
NEUTROPHILS # BLD AUTO: 5.8 10E9/L (ref 1.6–8.3)
NEUTROPHILS NFR BLD AUTO: 76.3 %
NITRATE UR QL: NEGATIVE
NRBC # BLD AUTO: 0 10*3/UL
NRBC BLD AUTO-RTO: 0 /100
PH UR STRIP: 5.5 PH (ref 4.7–8)
PLATELET # BLD AUTO: 95 10E9/L (ref 150–450)
POTASSIUM SERPL-SCNC: 3.4 MMOL/L (ref 3.4–5.3)
PROT SERPL-MCNC: 6.2 G/DL (ref 6.8–8.8)
RBC # BLD AUTO: 4.09 10E12/L (ref 4.4–5.9)
SODIUM SERPL-SCNC: 140 MMOL/L (ref 133–144)
SOURCE: NORMAL
SP GR UR STRIP: 1.01 (ref 1–1.03)
SPECIMEN SOURCE: ABNORMAL
SPECIMEN SOURCE: NORMAL
UROBILINOGEN UR STRIP-MCNC: NORMAL MG/DL (ref 0–2)
WBC # BLD AUTO: 7.7 10E9/L (ref 4–11)

## 2018-01-11 PROCEDURE — 87329 GIARDIA AG IA: CPT | Performed by: PHYSICIAN ASSISTANT

## 2018-01-11 PROCEDURE — 81003 URINALYSIS AUTO W/O SCOPE: CPT | Performed by: PHYSICIAN ASSISTANT

## 2018-01-11 PROCEDURE — 87045 FECES CULTURE AEROBIC BACT: CPT | Performed by: PHYSICIAN ASSISTANT

## 2018-01-11 PROCEDURE — 85025 COMPLETE CBC W/AUTO DIFF WBC: CPT | Performed by: PHYSICIAN ASSISTANT

## 2018-01-11 PROCEDURE — 99284 EMERGENCY DEPT VISIT MOD MDM: CPT | Mod: 25

## 2018-01-11 PROCEDURE — 87328 CRYPTOSPORIDIUM AG IA: CPT | Performed by: PHYSICIAN ASSISTANT

## 2018-01-11 PROCEDURE — 87046 STOOL CULTR AEROBIC BACT EA: CPT | Performed by: PHYSICIAN ASSISTANT

## 2018-01-11 PROCEDURE — 36415 COLL VENOUS BLD VENIPUNCTURE: CPT | Performed by: PHYSICIAN ASSISTANT

## 2018-01-11 PROCEDURE — 80053 COMPREHEN METABOLIC PANEL: CPT | Performed by: PHYSICIAN ASSISTANT

## 2018-01-11 PROCEDURE — 99284 EMERGENCY DEPT VISIT MOD MDM: CPT | Performed by: PHYSICIAN ASSISTANT

## 2018-01-11 PROCEDURE — 96360 HYDRATION IV INFUSION INIT: CPT

## 2018-01-11 PROCEDURE — 87493 C DIFF AMPLIFIED PROBE: CPT | Performed by: PHYSICIAN ASSISTANT

## 2018-01-11 PROCEDURE — 25000128 H RX IP 250 OP 636: Performed by: PHYSICIAN ASSISTANT

## 2018-01-11 RX ORDER — GLUCOSAMINE HCL/CHONDROITIN SU 500-400 MG
CAPSULE ORAL
COMMUNITY
Start: 2017-09-18

## 2018-01-11 RX ORDER — VANCOMYCIN HYDROCHLORIDE 125 MG/1
125 CAPSULE ORAL 4 TIMES DAILY
Qty: 40 CAPSULE | Refills: 0 | Status: SHIPPED | OUTPATIENT
Start: 2018-01-11 | End: 2018-01-21

## 2018-01-11 RX ADMIN — SODIUM CHLORIDE 1000 ML: 9 INJECTION, SOLUTION INTRAVENOUS at 17:32

## 2018-01-11 ASSESSMENT — ENCOUNTER SYMPTOMS
BLOOD IN STOOL: 0
SORE THROAT: 0
VOMITING: 0
DYSURIA: 0
CHILLS: 0
HEMATURIA: 0
APPETITE CHANGE: 0
NEUROLOGICAL NEGATIVE: 1
NAUSEA: 0
MUSCULOSKELETAL NEGATIVE: 1
HEADACHES: 0
FEVER: 0
DIARRHEA: 1
RECTAL PAIN: 0
SHORTNESS OF BREATH: 0
ABDOMINAL DISTENTION: 0
UNEXPECTED WEIGHT CHANGE: 0
ABDOMINAL PAIN: 1
CONSTIPATION: 0
ANAL BLEEDING: 0

## 2018-01-11 NOTE — ED AVS SNAPSHOT
[facility]    [address] [address]    Phone:  [phone]                                       Jason Suarez   MRN: [id number]    Department[facility]   Date of Visit:  1/11/2018           After Visit Summary Signature Page     I have received my discharge instructions, and my questions have been answered. I have discussed any challenges I see with this plan with the nurse or doctor.    ..........................................................................................................................................  Patient/Patient Representative Signature      ..........................................................................................................................................  Patient Representative Print Name and Relationship to Patient    ..................................................               ................................................  Date                                            Time    ..........................................................................................................................................  Reviewed by Signature/Title    ...................................................              ..............................................  Date                                                            Time

## 2018-01-11 NOTE — ED PROVIDER NOTES
History     Chief Complaint   Patient presents with     Abdominal Pain     lower abdominal pain for 2 weeks     HPI  Jason Suarez is a 37 year old male who presents with diarrhea x 2 weeks with lower abdominal pain starting 2 days ago. The diarrhea began 2 days after starting Amoxicillin for a dental infection. The stools have been very mucousy and he had one episode last night with streaks of blood. No blood today. 6+ stools per day. Denies fevers/chills. Pain is not presents when laying still. Pain/cramping is worse with exertion.    Problem List:    Patient Active Problem List    Diagnosis Date Noted     Esophageal reflux 01/11/2018     Priority: Medium     Dermatitis 06/26/2017     Priority: Medium     Cirrhosis, non-alcoholic (H) 01/06/2015     Priority: Medium     Micronodular cirrhosis (H) 07/10/2014     Priority: Medium     MEN 2A (multiple endocrine neoplasia, type 2A) (H) 04/07/2014     Priority: Medium     Coronary artery abnormality 05/25/2010     Priority: Medium     Family history of diabetes mellitus 05/25/2010     Priority: Medium     Fatty liver 05/25/2010     Priority: Medium     Polyp of gallbladder 05/25/2010     Priority: Medium     Genetic disorder 05/25/2010     Priority: Medium     Lymphadenopathy 05/25/2010     Priority: Medium     Medullary carcinoma of thyroid (H) 05/23/2010     Priority: Medium     Overview:   MEN2 Known Familial Mutation reported positive by [facility] 3-12-10. See scanned report       Family history of malignant neoplasm of thyroid 05/23/2010     Priority: Medium     Postoperative hypothyroidism 05/23/2010     Priority: Medium     Malignant neoplasm of thyroid gland (H) 03/29/2010     Priority: Medium     Obesity 03/29/2010     Priority: Medium        Past Medical History:    Past Medical History:   Diagnosis Date     Cirrhosis (H)        Past Surgical History:    No past surgical history on file.    Family History:    No family history on file.    Social  History:  Marital Status:   [2]  Social History   Substance Use Topics     Smoking status: Not on file     Smokeless tobacco: Not on file     Alcohol use Not on file        Medications:      Spironolactone (ALDACTONE PO)   VITAMIN D, CHOLECALCIFEROL, PO   VITAMIN E BLEND PO   Glucose Blood (BLOOD GLUCOSE TEST STRIPS) STRP   blood glucose monitoring (ONE TOUCH DELICA) lancets   vancomycin (VANCOCIN) 125 MG capsule   IBUPROFEN PO   Levothyroxine Sodium (LEVOTHROID PO)   Furosemide (LASIX PO)   predniSONE (DELTASONE) 20 MG tablet   amoxicillin (AMOXIL) 500 MG capsule         Review of Systems   Constitutional: Negative for appetite change, chills, fever and unexpected weight change.   HENT: Negative for sore throat.    Respiratory: Negative for shortness of breath.    Cardiovascular: Negative for chest pain.   Gastrointestinal: Positive for abdominal pain and diarrhea. Negative for abdominal distention, anal bleeding, blood in stool, constipation, nausea, rectal pain and vomiting.   Genitourinary: Negative.  Negative for dysuria, hematuria, scrotal swelling and testicular pain.   Musculoskeletal: Negative.    Skin: Negative.    Neurological: Negative.  Negative for headaches.   All other systems reviewed and are negative.      Physical Exam   BP: 157/70  Pulse: 91  Temp: 98.3  F (36.8  C)  Resp: 16  SpO2: 98 %      Physical Exam   Constitutional: He is oriented to person, place, and time. He appears well-developed and well-nourished.  Non-toxic appearance. He does not have a sickly appearance. He does not appear ill. No distress.   HENT:   Head: Normocephalic and atraumatic.   Nose: Nose normal.   Mouth/Throat: Oropharynx is clear and moist. No oropharyngeal exudate.   Eyes: Conjunctivae are normal. Pupils are equal, round, and reactive to light. Right eye exhibits no discharge. Left eye exhibits no discharge. No scleral icterus.   Neck: Normal range of motion. Neck supple.   Cardiovascular: Normal rate, regular  rhythm, normal heart sounds and intact distal pulses.  Exam reveals no gallop and no friction rub.    No murmur heard.  Pulmonary/Chest: Effort normal and breath sounds normal. No respiratory distress. He has no wheezes. He has no rales.   Abdominal: Soft. Bowel sounds are normal. He exhibits no distension and no mass. There is generalized tenderness. There is no rebound and no guarding.   Musculoskeletal: Normal range of motion. He exhibits no edema.   Lymphadenopathy:     He has no cervical adenopathy.   Neurological: He is alert and oriented to person, place, and time. No cranial nerve deficit. Coordination normal.   Skin: Skin is warm and dry. No rash noted. He is not diaphoretic. No erythema. No pallor.   Psychiatric: He has a normal mood and affect. His behavior is normal. Judgment and thought content normal.   Nursing note and vitals reviewed.      ED Course     ED Course     Procedures          Labs Ordered and Resulted from Time of ED Arrival Up to the Time of Departure from the ED   CBC WITH PLATELETS DIFFERENTIAL - Abnormal; Notable for the following:        Result Value    RBC Count 4.09 (*)     Hemoglobin 13.2 (*)     Hematocrit 36.0 (*)     MCHC 36.7 (*)     Platelet Count 95 (*)     All other components within normal limits   COMPREHENSIVE METABOLIC PANEL - Abnormal; Notable for the following:     Glucose 175 (*)     Creatinine 0.64 (*)     Calcium 7.8 (*)     Bilirubin Total 2.4 (*)     Albumin 2.7 (*)     Protein Total 6.2 (*)     All other components within normal limits   CLOSTRIDIUM DIFFICILE TOXIN B - Abnormal; Notable for the following:     C Diff Toxin B PCR Positive (*)     All other components within normal limits   URINE MACROSCOPIC WITH REFLEX TO MICRO   PERIPHERAL IV CATHETER   STOOL CULTURE SSCE   OVA AND PARASITE SCREEN       Assessments & Plan (with Medical Decision Making)   Jason is in NAD and non-toxic appearing. Generalized abdominal pain on exam. No peritoneal signs. He was given  a 1 liter bolus of NS here. C-diff positive today. RX for Vanco as below. Pt was discharged home in good condition.    Plan: Take the Vancomycin as prescribed for your c-diff infection.  probiotics over the counter and take daily. Continue drinking plenty of fluids. Return here with any new or worsening symptoms. Follow up with primary care in 1 week for re-check.    I have reviewed the nursing notes.    I have reviewed the findings, diagnosis, plan and need for follow up with the patient.    Discharge Medication List as of 1/11/2018  6:39 PM      START taking these medications    Details   vancomycin (VANCOCIN) 125 MG capsule Take 1 capsule (125 mg) by mouth 4 times daily for 10 days, Disp-40 capsule, R-0, E-Prescribe             Final diagnoses:   C. difficile diarrhea       1/11/2018   [facility]     Jazmin Pichardo PA-C  01/11/18 [id number]

## 2018-01-11 NOTE — ED AVS SNAPSHOT
[facility]    [address]    [place] [address]    Phone:  [phone]                                       Jason Suarez   MRN: [id number]    Department:  [facility]   Date of Visit:  1/11/2018           Patient Information     Date Of Birth          1980        Your diagnoses for this visit were:     C. difficile diarrhea        You were seen by Jazmin Pichardo PA-C.      Follow-up Information     Follow up with Antonio Wolff MD In 1 week.    Specialty:  Family Practice        Discharge Instructions       Take the Vancomycin as prescribed for your c-diff infection.  probiotics over the counter and take daily. Continue drinking plenty of fluids. Return here with any new or worsening symptoms. Follow up with primary care in 1 week for re-check.    Treating C. Difficile: Medicine to Prevent a Repeat Infection   Clostridium difficile (C. diff) are bacteria that can infect your large intestine. Your large intestine has millions of other bacteria. Many of them help keep you healthy. If you take an antibiotic to cure an infection, the medicine will kill the bacteria causing the infection. But it will also kill the good bacteria in your large intestine.   When these good bacteria are killed, C. diff bacteria can multiply. These bacteria release toxins in the intestine that cause symptoms such as diarrhea and belly (abdominal) pain.   To treat a C. diff infection, your healthcare provider will have you stop taking the antibiotic that caused the C. diff to multiply. You will likely take a different antibiotic to treat the C. diff. Treatment for C. diff stops the symptoms.   In some people, the symptoms come back after a short time (relapse). If you are being treated for C. diff and are at risk for a relapse, your healthcare provider may prescribe an additional medicine. This medicine is called bezlotoxumab. It helps stop the infection and symptoms from coming back.  It s given to adults ages 18 and older who are being treated for C. diff and are at high risk of having another C. diff infection.   Why is bezlotoxumab used?   After an infection of C. diff is treated, symptoms can come back weeks or months later. This may happen because the first treatment did not cure the infection. Or it may happen because you were infected again with C. diff. Getting a C. diff infection a second time is more likely if you are in places where C. diff spreads more easily, such as a hospital or nursing care facility. It can happen if your immune system is not working normally. This may be the case if you have a disease that affects the immune system or if you are an elderly adult. Or you may be taking medicine to lessen the response of your immune system. Bezlotoxumab can help prevent C. diff symptoms from coming back.   How does it work?   The medicine is a human monoclonal antibody. Antibodies are chemicals made by the immune system to fight illness. The medicine is an antibody created to work just like a person s own immune system. The medicine stops one of the toxins made by the C. diff bacteria. Bezlotoxumab does not treat the infection or kill the bacteria, so it is only used along with the antibiotic medicine used to treat C. diff.   Before your treatment   Tell your healthcare provider if any of the below apply to you:    You are pregnant or may be pregnant. This medicine hasn't been tested on pregnant women. Researchers don t yet know the effects on a baby in the womb.    You are breastfeeding. This medicine hasn't been tested on breastfeeding women. Researchers don t yet know if the medicine can show up in breastmilk.    You have congestive heart failure (CHF). Heart failure and death after treatment are more common in people with CHF who are treated with this medicine.    Have high blood pressure. The medicine may cause blood pressure to rise on the day of treatment.   Talk with your  healthcare provider about the risks and benefits to you before treatment.   How the medicine is given   Bezlotoxumab is a liquid medicine that is given through an IV line into a vein. A healthcare provider will put a needle into a vein in your arm or hand. A thin, flexible tube (catheter) is then put into the vein. The medicine drips slowly through the tube into your vein. It takes about 1 hour to complete the treatment. You get this treatment just one time while you are taking the antibiotics.   Side effects   Possible side effects on the day of treatment can include:    Nausea    Headache    Fever    Dizzy feeling    Feeling short of breath    Tiredness    High blood pressure   Possible side effects within 4 weeks of treatment can include:    Nausea    Fever    Headache   Tell your healthcare providers if you have any other side effects not listed here.  Date Last Reviewed: 1/1/2017 2000-2017 [employer]. [address] [place] [address]. All rights reserved. This information is not intended as a substitute for professional medical care. Always follow your healthcare professional's instructions.          Clostridium Difficile Infection  Clostridium difficile (C. diff) bacteria can be very harmful. They affect the intestinal tract. They can cause symptoms ranging from mild diarrhea to severe inflammation of the large intestine (colon). C. diff infection is most common during the days and weeks after treatment with antibiotics. Anyone can become infected. But the risk is greatly increased for people in hospitals and for people living in nursing homes or long-term care facilities. This is because antibiotic use is common there. Germs also spread easily in these places.    What causes C. diff infection?  The stomach and intestines have hundreds of kinds of bacteria. Many of these bacteria actually help keep harmful bacteria like C. diff from causing problems. Small amounts of C. diff are  normal in the intestine and don t cause problems. When you take an antibiotic, the normal balance of good and bad bacteria may be affected. There may be too few good bacteria and too many harmful bacteria like C diff. In hospitals and nursing homes, C. diff may be spread from an infected person to others. This can happen when staff or visitors touch infected people or objects such as bed rails, stethoscopes, or bedpans and then touch other people or surfaces.  What are the symptoms of C. diff infection?  About half of people with C. diff infection have no symptoms. Yet they can still pass the infection to others. Others do have symptoms. These include:    Watery diarrhea, which may contain mucus    Pain and cramping    Fever  Some who are infected develop serious problems. Symptoms include:    Belly (abdominal) pain    Abdominal swelling    Nausea and vomiting    Little or no diarrhea  How is C. diff infection diagnosed?  To confirm the infection, a sample of stool is tested for the bacteria or the toxins made by the bacteria.  How is C. diff infection treated?  Your healthcare provider will tell you to stop taking any antibiotics you have been prescribed, based on your healthcare needs. He or she may prescribe different medicines as needed. In certain cases, you may be given an antibiotic directed at the C. diff infection. Talk with your healthcare provider before stopping or starting any medicines.    Fluids are often given by IV (intravenously) through a vein. This helps replace fluids lost through diarrhea.    In rare cases you may need surgery if treatment doesn t cure severe symptoms  To lessen symptoms:    Drink plenty of fluids to replace water lost through diarrhea. Talk with your healthcare provider or nurse about which fluids are best.    Follow your healthcare provider s instructions for when and what to eat.    Unless your healthcare provider tells you to do so, don't take medicines for  diarrhea.    Tell your healthcare provider if symptoms return. Even after treatment, C. diff may come back.  Your doctor may give you an additional medicine if your symptoms come back or you are at risk for another C. diff infection. This medicine is called bezlotoxumab. It is not an antibiotic, but it can help keep your C. diff symptoms from returning.  What are the complications of C. diff infection?  Complications include:    Dehydration    Electrolyte imbalances    Low protein in the blood    Severe widening (dilation) of the large intestine    A hole (perforation) in the bowel    Low blood pressure    Kidney failure    Inflammation or infection all over the body    Death  How is C. diff prevented?  Hospitals and nursing homes take these steps to help prevent C. diff infections:    Limiting use of antibiotics. Giving antibiotics only when needed can help reduce C. diff infections.    Handwashing. Hospital staff should wash their hands before and after treating each person. They should also wash their hands after touching any surface in someone's' room. Soap and water work better than alcohol-based hand .    Protective clothing. Healthcare workers should wear gloves and a gown when entering the room of someone with C. diff infection. They should remove these items before leaving and then wash their hands.    Private rooms. People with C. diff should be in private rooms. Or they may share rooms with others who have the same infection.    Thorough cleaning. Equipment and rooms should be cleaned and disinfected every day.    Education. Everyone should be shown the best ways to avoid infection.  You can do the following to help prevent C. diff:    Take antibiotics only when you really need them. Antibiotics don t help treat illnesses caused by viruses. This includes colds and the flu. Don t ask for antibiotics from your healthcare provider if he or she says they won t work.    When you are given antibiotics,  take them as directed. Don t take more or less than the dosage prescribed. Do not take them for shorter or longer than your provider tells you to, even if you feel better.    Wash your hands carefully. Do this after using the bathroom and before eating. Use plenty of soap and warm water. Alcohol-based hand  may not work against C. diff germs.  Everyone can help prevent C. diff:  In a hospital or care facility:    Wash your hands well before and after visiting someone who has C. diff infection. Use soap and water. Alcohol-based hand  may not work against C. diff.    If the staff asks you to, wear gloves. Take any other steps you are asked to follow to help prevent infection.  At home:    If instructed, wear gloves when caring for a family member with C. diff infection. Throw the gloves away after each use. Then wash your hands well.    Wash the person s clothes, bed linen, and towels separately. Use hot water. Use both detergent and liquid bleach.    Disinfect surfaces in the person s room. This includes the phone, light switches, and remote controls.  Practice good handwashing:    Use warm water and plenty of soap. Rub your hands together well.    Clean your whole hand. Wash under nails, between fingers, and up your wrists.    Wash for at least 15 seconds to 20 seconds.     Rinse. Let the water run down your fingers, not up your wrists.    Dry your hands well. Then use a paper towel to turn off the faucet and open the door.  Date Last Reviewed: 1/1/2017 2000-2017 [employer]. [address] [place] [address], [place] [id number]. All rights reserved. This information is not intended as a substitute for professional medical care. Always follow your healthcare professional's instructions.             Review of your medicines      START taking        Dose / Directions Last dose taken    vancomycin 125 MG capsule   Commonly known as:  VANCOCIN   Dose:  125 mg   Quantity:  40 capsule        Take 1  capsule (125 mg) by mouth 4 times daily for 10 days   Refills:  0          Our records show that you are taking the medicines listed below. If these are incorrect, please call your family doctor or clinic.        Dose / Directions Last dose taken    ALDACTONE PO   Dose:  50 mg        Take 50 mg by mouth daily   Refills:  0        amoxicillin 500 MG capsule   Commonly known as:  AMOXIL   Dose:  500 mg   Quantity:  30 capsule        Take 1 capsule (500 mg) by mouth 3 times daily   Refills:  0        blood glucose monitoring lancets        Refills:  0        BLOOD GLUCOSE TEST STRIPS Strp        Test two times a day   Refills:  0        IBUPROFEN PO   Dose:  800 mg        Take 800 mg by mouth every 4 hours as needed for moderate pain   Refills:  0        LASIX PO   Dose:  20 mg        Take 20 mg by mouth daily   Refills:  0        LEVOTHROID PO   Dose:  137 mcg        Take 137 mcg by mouth daily   Refills:  0        predniSONE 20 MG tablet   Commonly known as:  DELTASONE   Dose:  40 mg   Quantity:  4 tablet        Take 2 tablets (40 mg) by mouth daily   Refills:  0        VITAMIN D (CHOLECALCIFEROL) PO   Dose:  1000 Units        Take 1,000 Units by mouth daily   Refills:  0        VITAMIN E BLEND PO        Take by mouth daily   Refills:  0                Prescriptions were sent or printed at these locations (1 Prescription)                   [id number] - [place], [place] - [address] [address] [address]   [address] [address], [place] [address]    Telephone:  [phone]   Fax:  [phone]   Hours:                  E-Prescribed (1 of 1)         vancomycin (VANCOCIN) 125 MG capsule                Procedures and tests performed during your visit     CBC with platelets differential    Clostridium difficile toxin B PCR    Comprehensive metabolic panel    Ova and Parasite Screen    Peripheral IV catheter    Stool culture SSCE    UA reflex to Microscopic      Orders Needing Specimen Collection     None      Pending Results     Date and  "Time Order Name Status Description    2018 1656 Stool culture SSCE In process             Pending Culture Results     Date and Time Order Name Status Description    2018 1656 Stool culture SSCE In process             Thank you for choosing [facility]       Thank you for choosing [facility] for your care. Our goal is always to provide you with excellent care. Hearing back from our patients is one way we can continue to improve our services. Please take a few minutes to complete the written survey that you may receive in the mail after you visit with us. Thank you!        ezzai - how to arabia Information     [employer] how to arabia lets you send messages to your doctor, view your test results, renew your prescriptions, schedule appointments and more. To sign up, go to [email or web address] - how to arabia . Click on \"Log in\" on the left side of the screen, which will take you to the Welcome page. Then click on \"Sign up Now\" on the right side of the page.     You will be asked to enter the access code listed below, as well as some personal information. Please follow the directions to create your username and password.     Your access code is: [email or web address]  Expires: 3/29/2018 10:18 AM     Your access code will  in 90 days. If you need help or a new code, please call your [facility] clinic or [phone].        Care EveryWhere ID     This is your Care EveryWhere ID. This could be used by other organizations to access your [facility] medical records  [id number]        Equal Access to Services     MIGUE GOYAL AH: Hadii toi phippso Sojeet, waaxda luqadaha, qaybta kaalmada adeegyada, ronda grullon . So [facility] [phone].    ATENCIÓN: Si habla español, tiene a casilals disposición servicios gratuitos de asistencia lingüística. Llame al [phone].    We comply with applicable federal civil rights laws and [place] laws. We do not discriminate on the basis of race, color, national origin, age, disability, sex, sexual orientation, " or gender identity.            After Visit Summary       This is your record. Keep this with you and show to your community pharmacist(s) and doctor(s) at your next visit.

## 2018-01-11 NOTE — ED NOTES
The patient reports he was placed on Amoxicillin and prednisone for a dental infection and root canal two weeks ago. Two days after starting the Amoxicillin he developed mucusy diarrhea stools, approximately 6 stools daily. Last night he noted blood in the toilet with the stool. He reports lower abdominal pain and bloating, the pain is constant with waves of stabbing lower abdominal pain before stools or with position changes. Appetite good and denies fevers or chills.

## 2018-01-12 NOTE — PROVIDER NOTIFICATION
DATE:  1/11/2018   TIME OF RECEIPT FROM LAB:  1811   LAB TEST:  C.diff  LAB VALUE:  positive  RESULTS GIVEN WITH READ-BACK TO (PROVIDER):  Macy Pichardo  TIME LAB VALUE REPORTED TO PROVIDER:   1811

## 2018-01-12 NOTE — ED NOTES
The patient is ready for discharge and verbalized understanding of his discharge instructions as written.

## 2018-01-14 LAB
BACTERIA SPEC CULT: NORMAL
BACTERIA SPEC CULT: NORMAL
E COLI SXT1+2 STL IA: NORMAL
SPECIMEN SOURCE: NORMAL

## 2018-01-24 ENCOUNTER — HISTORY (OUTPATIENT)
Dept: EMERGENCY MEDICINE | Facility: OTHER | Age: 38
End: 2018-01-24

## 2018-01-27 VITALS
RESPIRATION RATE: 16 BRPM | WEIGHT: 315 LBS | SYSTOLIC BLOOD PRESSURE: 124 MMHG | SYSTOLIC BLOOD PRESSURE: 180 MMHG | DIASTOLIC BLOOD PRESSURE: 72 MMHG | SYSTOLIC BLOOD PRESSURE: 130 MMHG | DIASTOLIC BLOOD PRESSURE: 72 MMHG | BODY MASS INDEX: 50.5 KG/M2 | HEART RATE: 80 BPM | BODY MASS INDEX: 47.42 KG/M2 | WEIGHT: 315 LBS | DIASTOLIC BLOOD PRESSURE: 90 MMHG

## 2018-01-27 VITALS
SYSTOLIC BLOOD PRESSURE: 128 MMHG | WEIGHT: 315 LBS | BODY MASS INDEX: 48.28 KG/M2 | HEART RATE: 84 BPM | DIASTOLIC BLOOD PRESSURE: 60 MMHG

## 2018-01-27 VITALS
WEIGHT: 315 LBS | DIASTOLIC BLOOD PRESSURE: 68 MMHG | HEART RATE: 84 BPM | HEIGHT: 72 IN | SYSTOLIC BLOOD PRESSURE: 150 MMHG | BODY MASS INDEX: 42.66 KG/M2

## 2018-01-29 ASSESSMENT — ANXIETY QUESTIONNAIRES: GAD7 TOTAL SCORE: 0

## 2018-01-29 ASSESSMENT — PATIENT HEALTH QUESTIONNAIRE - PHQ9
SUM OF ALL RESPONSES TO PHQ QUESTIONS 1-9: 0
SUM OF ALL RESPONSES TO PHQ QUESTIONS 1-9: 0

## 2018-02-12 ENCOUNTER — DOCUMENTATION ONLY (OUTPATIENT)
Dept: FAMILY MEDICINE | Facility: OTHER | Age: 38
End: 2018-02-12

## 2018-02-12 PROBLEM — E11.9 TYPE 2 DIABETES MELLITUS WITHOUT COMPLICATION, WITHOUT LONG-TERM CURRENT USE OF INSULIN (H): Status: ACTIVE | Noted: 2017-06-29

## 2018-02-12 PROBLEM — Z13.220 SCREENING FOR LIPID DISORDERS: Status: ACTIVE | Noted: 2017-06-26

## 2018-02-12 RX ORDER — ASPIRIN 81 MG/1
81 TABLET ORAL
COMMUNITY
Start: 2017-06-29 | End: 2018-07-16

## 2018-02-12 RX ORDER — SPIRONOLACTONE 50 MG/1
50 TABLET, FILM COATED ORAL
COMMUNITY
Start: 2017-10-22

## 2018-02-12 RX ORDER — CLINDAMYCIN HCL 300 MG
300 CAPSULE ORAL
COMMUNITY
Start: 2017-11-17 | End: 2023-12-12

## 2018-02-12 RX ORDER — LISINOPRIL 10 MG/1
10 TABLET ORAL DAILY
COMMUNITY
Start: 2017-06-29 | End: 2018-08-04

## 2018-02-12 RX ORDER — LEVOTHYROXINE SODIUM 150 UG/1
150 TABLET ORAL DAILY
COMMUNITY
Start: 2017-10-23 | End: 2018-07-16

## 2018-02-12 RX ORDER — FUROSEMIDE 20 MG
80 TABLET ORAL DAILY
COMMUNITY
Start: 2017-09-18

## 2018-02-12 RX ORDER — VITAMIN E 268 MG
800 CAPSULE ORAL DAILY
COMMUNITY

## 2018-02-12 RX ORDER — TRIAMCINOLONE ACETONIDE 5 MG/G
CREAM TOPICAL
COMMUNITY
Start: 2017-06-26 | End: 2018-08-04

## 2018-05-07 ENCOUNTER — TRANSFERRED RECORDS (OUTPATIENT)
Dept: HEALTH INFORMATION MANAGEMENT | Facility: CLINIC | Age: 38
End: 2018-05-07

## 2018-05-07 LAB
ALT SERPL-CCNC: 51 IU/L (ref 6–40)
AST SERPL-CCNC: 60 IU/L (ref 10–40)
CREAT SERPL-MCNC: 0.8 MG/DL (ref 0.7–1.2)
GLUCOSE SERPL-MCNC: 411 MG/DL (ref 70–100)
POTASSIUM SERPL-SCNC: 4.4 MEQ/L (ref 3.4–5.1)

## 2018-05-18 ENCOUNTER — HOSPITAL ENCOUNTER (OUTPATIENT)
Dept: MRI IMAGING | Facility: HOSPITAL | Age: 38
Discharge: HOME | End: 2018-05-18
Attending: PHYSICIAN ASSISTANT | Admitting: PHYSICIAN ASSISTANT
Payer: COMMERCIAL

## 2018-05-18 DIAGNOSIS — K76.0 FATTY LIVER: ICD-10-CM

## 2018-05-18 DIAGNOSIS — K74.60 CIRRHOSIS, NONALCOHOLIC (H): ICD-10-CM

## 2018-05-18 PROCEDURE — 25000128 H RX IP 250 OP 636: Performed by: RADIOLOGY

## 2018-05-18 PROCEDURE — 74183 MRI ABD W/O CNTR FLWD CNTR: CPT | Mod: TC

## 2018-05-18 PROCEDURE — A9585 GADOBUTROL INJECTION: HCPCS | Performed by: RADIOLOGY

## 2018-05-18 RX ORDER — GADOBUTROL 604.72 MG/ML
7.5 INJECTION INTRAVENOUS ONCE
Status: COMPLETED | OUTPATIENT
Start: 2018-05-18 | End: 2018-05-18

## 2018-05-18 RX ADMIN — GADOBUTROL 7.5 ML: 604.72 INJECTION INTRAVENOUS at 08:13

## 2018-07-16 ENCOUNTER — OFFICE VISIT (OUTPATIENT)
Dept: FAMILY MEDICINE | Facility: OTHER | Age: 38
End: 2018-07-16
Attending: FAMILY MEDICINE
Payer: COMMERCIAL

## 2018-07-16 VITALS
BODY MASS INDEX: 42.66 KG/M2 | HEIGHT: 72 IN | DIASTOLIC BLOOD PRESSURE: 70 MMHG | HEART RATE: 64 BPM | WEIGHT: 315 LBS | SYSTOLIC BLOOD PRESSURE: 140 MMHG

## 2018-07-16 DIAGNOSIS — Z00.00 ROUTINE GENERAL MEDICAL EXAMINATION AT A HEALTH CARE FACILITY: Primary | ICD-10-CM

## 2018-07-16 PROCEDURE — G0463 HOSPITAL OUTPT CLINIC VISIT: HCPCS

## 2018-07-16 PROCEDURE — 99396 PREV VISIT EST AGE 40-64: CPT | Performed by: FAMILY MEDICINE

## 2018-07-16 RX ORDER — GLIMEPIRIDE 1 MG/1
0.5 TABLET ORAL DAILY
Refills: 3 | COMMUNITY
Start: 2018-07-02 | End: 2023-12-12

## 2018-07-16 RX ORDER — LEVOTHYROXINE SODIUM 175 UG/1
175 TABLET ORAL DAILY
COMMUNITY
Start: 2018-07-05 | End: 2023-12-12

## 2018-07-16 RX ORDER — HYDROXYZINE PAMOATE 25 MG/1
1 CAPSULE ORAL PRN
Refills: 3 | COMMUNITY
Start: 2018-07-02

## 2018-07-16 ASSESSMENT — ANXIETY QUESTIONNAIRES
GAD7 TOTAL SCORE: 0
7. FEELING AFRAID AS IF SOMETHING AWFUL MIGHT HAPPEN: NOT AT ALL
3. WORRYING TOO MUCH ABOUT DIFFERENT THINGS: NOT AT ALL
5. BEING SO RESTLESS THAT IT IS HARD TO SIT STILL: NOT AT ALL
2. NOT BEING ABLE TO STOP OR CONTROL WORRYING: NOT AT ALL
6. BECOMING EASILY ANNOYED OR IRRITABLE: NOT AT ALL
1. FEELING NERVOUS, ANXIOUS, OR ON EDGE: NOT AT ALL

## 2018-07-16 ASSESSMENT — PATIENT HEALTH QUESTIONNAIRE - PHQ9: 5. POOR APPETITE OR OVEREATING: NOT AT ALL

## 2018-07-16 NOTE — MR AVS SNAPSHOT
"              After Visit Summary   2018    Jason Suarez    MRN: [id number]           Patient Information     Date Of Birth          1980        Visit Information        Provider Department      2018 8:00 AM Antonio Wolff MD [facility]        Today's Diagnoses     Routine general medical examination at a health care facility    -  1       Follow-ups after your visit        Who to contact     If you have questions or need follow up information about today's clinic visit or your schedule please contact [facility] directly at [phone].  Normal or non-critical lab and imaging results will be communicated to you by Luxofthart, letter or phone within 4 business days after the clinic has received the results. If you do not hear from us within 7 days, please contact the clinic through Desurat or phone. If you have a critical or abnormal lab result, we will notify you by phone as soon as possible.  Submit refill requests through [employer]App or call your pharmacy and they will forward the refill request to us. Please allow 3 business days for your refill to be completed.          Additional Information About Your Visit        MyChart Information     [employer]App lets you send messages to your doctor, view your test results, renew your prescriptions, schedule appointments and more. To sign up, go to [email or web address] . Click on \"Log in\" on the left side of the screen, which will take you to the Welcome page. Then click on \"Sign up Now\" on the right side of the page.     You will be asked to enter the access code listed below, as well as some personal information. Please follow the directions to create your username and password.     Your access code is: [email or web address]  Expires: 10/16/2018  1:29 PM     Your access code will  in 90 days. If you need help or a new code, please call your [facility] clinic or [phone].        Care EveryWhere ID     This is your " "Care EveryWhere ID. This could be used by other organizations to access your [facility] medical records  [id number]        Your Vitals Were     Pulse Height BMI (Body Mass Index)             64 6' 0.05\" (1.83 m) 48.81 kg/m2          Blood Pressure from Last 3 Encounters:   07/16/18 140/70   01/11/18 157/70   12/29/17 150/74    Weight from Last 3 Encounters:   07/16/18 (!) 360 lb 6.4 oz (163.5 kg)   12/29/17 (!) 361 lb (163.7 kg)   11/21/17 (!) 356 lb (161.5 kg)              Today, you had the following     No orders found for display         Today's Medication Changes          These changes are accurate as of 7/16/18 11:59 PM.  If you have any questions, ask your nurse or doctor.               These medicines have changed or have updated prescriptions.        Dose/Directions    furosemide 20 MG tablet   Commonly known as:  LASIX   This may have changed:  Another medication with the same name was removed. Continue taking this medication, and follow the directions you see here.   Changed by:  nAtonio Wolff MD        Dose:  20 mg   Take 20 mg by mouth daily   Refills:  0       levothyroxine 175 MCG tablet   Commonly known as:  SYNTHROID/LEVOTHROID   This may have changed:  Another medication with the same name was removed. Continue taking this medication, and follow the directions you see here.   Changed by:  Antonio Wolff MD        Dose:  175 mcg   Take 175 mcg by mouth daily   Refills:  0       spironolactone 50 MG tablet   Commonly known as:  ALDACTONE   This may have changed:  Another medication with the same name was removed. Continue taking this medication, and follow the directions you see here.   Changed by:  Antonio Wolff MD        Dose:  50 mg   Take 50 mg by mouth   Refills:  0       vitamin D3 1000 units Caps   This may have changed:  Another medication with the same name was removed. Continue taking this medication, and follow the directions you see here.   Changed by:  Antonio Wolff MD     "    Dose:  1000 Units   Take 1,000 Units by mouth daily   Refills:  0       vitamin E 400 UNIT capsule   This may have changed:  Another medication with the same name was removed. Continue taking this medication, and follow the directions you see here.   Changed by:  Antonio Wolff MD        Dose:  800 Units   Take 800 Units by mouth daily   Refills:  0         Stop taking these medicines if you haven't already. Please contact your care team if you have questions.     amoxicillin 500 MG capsule   Commonly known as:  AMOXIL   Stopped by:  Antonio Wolff MD           aspirin 81 MG EC tablet   Stopped by:  Antonio Wolff MD           CENTRUM SILVER PO   Stopped by:  Antonio Wolff MD           IBUPROFEN PO   Stopped by:  Antonio Wolff MD           predniSONE 20 MG tablet   Commonly known as:  DELTASONE   Stopped by:  Antonio Wolff MD                    Primary Care Provider Office Phone # Fax #    Antonio Wolff [phone] [address] [facility] [address]        Equal Access to Services     [facility]: Hadii aad ku hadasho Soomaali, waaxda luqadaha, qaybta kaalmada adeegyada, waxay idiin hayaan kelechi grullon . So [facility] [phone].    ATENCIÓN: Si sol espbeto, tiene a casillas disposición servicios gratuitos de asistencia lingüística. Llame al [phone].    We comply with applicable federal civil rights laws and [place] laws. We do not discriminate on the basis of race, color, national origin, age, disability, sex, sexual orientation, or gender identity.            Thank you!     Thank you for choosing [facility]\""  for your care. Our goal is always to provide you with excellent care. Hearing back from our patients is one way we can continue to improve our services. Please take a few minutes to complete the written survey that you may receive in the mail after your visit with us. Thank you!             Your Updated Medication List  - Protect others around you: Learn how to safely use, store and throw away your medicines at www.disposemymeds.org.          This list is accurate as of 7/16/18 11:59 PM.  Always use your most recent med list.                   Brand Name Dispense Instructions for use Diagnosis    blood glucose monitoring lancets           BLOOD GLUCOSE TEST STRIPS Strp      Test two times a day        clindamycin 300 MG capsule    CLEOCIN     Take 300 mg by mouth        furosemide 20 MG tablet    LASIX     Take 20 mg by mouth daily        glimepiride 1 MG tablet    AMARYL     Take 1 tablet by mouth daily        hydrOXYzine 25 MG capsule    VISTARIL     Take 1 capsule by mouth as needed for itching        levothyroxine 175 MCG tablet    SYNTHROID/LEVOTHROID     Take 175 mcg by mouth daily        lisinopril 10 MG tablet    PRINIVIL/ZESTRIL     Take 10 mg by mouth daily        metFORMIN 500 MG tablet    GLUCOPHAGE     Take 500 mg by mouth 2 times daily (with meals)        spironolactone 50 MG tablet    ALDACTONE     Take 50 mg by mouth        triamcinolone 0.5 % cream    KENALOG          vitamin D3 1000 units Caps      Take 1,000 Units by mouth daily        vitamin E 400 UNIT capsule      Take 800 Units by mouth daily

## 2018-07-17 ASSESSMENT — ANXIETY QUESTIONNAIRES: GAD7 TOTAL SCORE: 0

## 2018-07-17 ASSESSMENT — PATIENT HEALTH QUESTIONNAIRE - PHQ9: SUM OF ALL RESPONSES TO PHQ QUESTIONS 1-9: 0

## 2018-07-18 PROBLEM — Z00.00 ROUTINE GENERAL MEDICAL EXAMINATION AT A HEALTH CARE FACILITY: Status: ACTIVE | Noted: 2018-07-18

## 2018-07-18 NOTE — PROGRESS NOTES
SUBJECTIVE:   Jason Suarez is a 38 year old male who presents to clinic today for the following health issues:  physical    HPI Comments: Patient arrives here for a  physical.  No complaints    Physical         Patient Active Problem List    Diagnosis Date Noted     Esophageal reflux 01/11/2018     Priority: Medium     Type 2 diabetes mellitus without complication, without long-term current use of insulin (H) 06/29/2017     Priority: Medium     Dermatitis 06/26/2017     Priority: Medium     Screening for lipid disorders 06/26/2017     Priority: Medium     Cirrhosis, non-alcoholic (H) 01/06/2015     Priority: Medium     Micronodular cirrhosis (H) 07/10/2014     Priority: Medium     MEN 2A (multiple endocrine neoplasia, type 2A) (H) 04/07/2014     Priority: Medium     Hypothyroidism 01/16/2013     Priority: Medium     Coronary artery abnormality 05/25/2010     Priority: Medium     Family history of diabetes mellitus 05/25/2010     Priority: Medium     Fatty liver 05/25/2010     Priority: Medium     Polyp of gallbladder 05/25/2010     Priority: Medium     Genetic disorder 05/25/2010     Priority: Medium     Lymphadenopathy 05/25/2010     Priority: Medium     Medullary carcinoma of thyroid (H) 05/23/2010     Priority: Medium     Overview:   MEN2 Known Familial Mutation reported positive by [facility] 3-12-10. See scanned report       Family history of malignant neoplasm of thyroid 05/23/2010     Priority: Medium     Postoperative hypothyroidism 05/23/2010     Priority: Medium     Malignant neoplasm of thyroid gland (H) 03/29/2010     Priority: Medium     Obesity 03/29/2010     Priority: Medium     Past Medical History:   Diagnosis Date     Acute pancreatitis without infection or necrosis     H/O gallbladder pancreatitis     Closed fracture of shaft of left humerus     Bilateral arm fractures as a child     Gastro-esophageal reflux disease without esophagitis     No Comments Provided     Malignant  "neoplasm of thyroid gland (H)     No Comments Provided     Multiple endocrine neoplasia (MEN) type IIA (H)     No Comments Provided     Obesity     No Comments Provided     Other fracture of left lower leg, initial encounter for closed fracture     No Comments Provided     Umbilical hernia without obstruction or gangrene     hernia repair 7/10/14      No Known Allergies    Review of Systems     OBJECTIVE:     /70 (BP Location: Right arm, Patient Position: Sitting)  Pulse 64  Ht 6' 0.05\" (1.83 m)  Wt (!) 360 lb 6.4 oz (163.5 kg)  BMI 48.81 kg/m2  Body mass index is 48.81 kg/(m^2).  Physical Exam   Constitutional:   Obese   Eyes: Pupils are equal, round, and reactive to light.   Cardiovascular: Normal rate and regular rhythm.    Pulmonary/Chest: Effort normal and breath sounds normal.   Musculoskeletal: Normal range of motion.   Neurological: He is alert.   Psychiatric: He has a normal mood and affect.       none     ASSESSMENT/PLAN:         1. Routine general medical examination at a health care facility  Satisfactory.  Please see completed form for complete details.        Antonio Wolff MD  [facility]  "

## 2018-07-23 NOTE — PROGRESS NOTES
Patient Information     Patient Name  Jason Suarez MRN  [id number] Sex  Male   1980      Letter by Antonio Wolff MD at      Author:  Antonio Wolff MD Service:  (none) Author Type:  (none)    Filed:   Encounter Date:  2017 Status:  (Other)           Jason Suarez  [address]  [facility] [address]          2017    Dear Mr. Suarez:    This letter is to remind you that you are due for your diabetes management exam and labs with Antonio Wolff MD     We recommend that you complete this appointment to assess your progress and need for possible medication changes.    Please call the clinic at [phone] to schedule your appointment.    Thank you for choosing [facility] and [facility] for your health care needs.    Sincerely,    Family Practice RN  [facility]

## 2018-08-04 ENCOUNTER — OFFICE VISIT (OUTPATIENT)
Dept: FAMILY MEDICINE | Facility: OTHER | Age: 38
End: 2018-08-04
Attending: NURSE PRACTITIONER
Payer: COMMERCIAL

## 2018-08-04 VITALS
HEART RATE: 100 BPM | DIASTOLIC BLOOD PRESSURE: 78 MMHG | TEMPERATURE: 98.8 F | SYSTOLIC BLOOD PRESSURE: 136 MMHG | WEIGHT: 315 LBS | BODY MASS INDEX: 49.11 KG/M2

## 2018-08-04 DIAGNOSIS — L03.116 CELLULITIS OF LEFT LOWER EXTREMITY: Primary | ICD-10-CM

## 2018-08-04 PROCEDURE — 99214 OFFICE O/P EST MOD 30 MIN: CPT | Performed by: NURSE PRACTITIONER

## 2018-08-04 PROCEDURE — G0463 HOSPITAL OUTPT CLINIC VISIT: HCPCS

## 2018-08-04 RX ORDER — CEPHALEXIN 500 MG/1
500 CAPSULE ORAL 4 TIMES DAILY
Qty: 40 CAPSULE | Refills: 0 | Status: SHIPPED | OUTPATIENT
Start: 2018-08-04 | End: 2023-12-12

## 2018-08-04 ASSESSMENT — PAIN SCALES - GENERAL: PAINLEVEL: NO PAIN (1)

## 2018-08-04 NOTE — NURSING NOTE
Pt present to clinic today for possible cellulitis, he noticed it yesterday. His symptoms include hot, itchy and burning of his skin on his left calf. He has been taking ibuprofen at home.  Gina Peralta LPN on 8/4/2018 at 12:02 PM

## 2018-08-04 NOTE — PATIENT INSTRUCTIONS
ASSESSMENT/PLAN:     1. Cellulitis of left lower extremity  Acute, symptomatic  Keflex four times daily for 10 days  - Take entire course of antibiotic even if you start to feel better.  - Antibiotics can cause stomach upset including nausea and diarrhea. Read your bottle or ask the pharmacist if antibiotic can be taken with food to help prevent nausea. If you have symptoms of diarrhea you can take an over-the-counter probiotic and/or increase foods with probiotics such as yogurt, [place], sauerkraut.    - If worsening redness, increased pain, fever while taking antibiotics return for further evaluation.      Li Marcos, CNP  [facility] AND [facility]

## 2018-08-04 NOTE — MR AVS SNAPSHOT
After Visit Summary   8/4/2018    Jason Suarez    MRN: [id number]           Patient Information     Date Of Birth          1980        Visit Information        Provider Department      8/4/2018 11:45 AM Li Marcos CNP [facility]        Today's Diagnoses     Cellulitis of left lower extremity    -  1      Care Instructions    ASSESSMENT/PLAN:     1. Cellulitis of left lower extremity  Acute, symptomatic  Keflex four times daily for 10 days  - Take entire course of antibiotic even if you start to feel better.  - Antibiotics can cause stomach upset including nausea and diarrhea. Read your bottle or ask the pharmacist if antibiotic can be taken with food to help prevent nausea. If you have symptoms of diarrhea you can take an over-the-counter probiotic and/or increase foods with probiotics such as yogurt, [place], sauerkraut.    - If worsening redness, increased pain, fever while taking antibiotics return for further evaluation.      Li Marcos CNP  [facility]          Follow-ups after your visit        Who to contact     If you have questions or need follow up information about today's clinic visit or your schedule please contact [facility] AND [facility] directly at [phone].  Normal or non-critical lab and imaging results will be communicated to you by Sonar[employer]mehart, letter or phone within 4 business days after the clinic has received the results. If you do not hear from us within 7 days, please contact the clinic through Sonar.mehart or phone. If you have a critical or abnormal lab result, we will notify you by phone as soon as possible.  Submit refill requests through [employer] or call your pharmacy and they will forward the refill request to us. Please allow 3 business days for your refill to be completed.          Additional Information About Your Visit        MyChart Information     [employer] lets you send messages to your doctor, view  "your test results, renew your prescriptions, schedule appointments and more. To sign up, go to [email or web address] . Click on \"Log in\" on the left side of the screen, which will take you to the Welcome page. Then click on \"Sign up Now\" on the right side of the page.     You will be asked to enter the access code listed below, as well as some personal information. Please follow the directions to create your username and password.     Your access code is: [email or web address]  Expires: 10/16/2018  1:29 PM     Your access code will  in 90 days. If you need help or a new code, please call your [place] clinic or [phone].        Care EveryWhere ID     This is your Care EveryWhere ID. This could be used by other organizations to access your [place] medical records  [id number]        Your Vitals Were     Pulse Temperature BMI (Body Mass Index)             100 98.8  F (37.1  C) (Tympanic) 49.11 kg/m2          Blood Pressure from Last 3 Encounters:   18 136/78   18 140/70   18 157/70    Weight from Last 3 Encounters:   18 (!) 362 lb 9.6 oz (164.5 kg)   18 (!) 360 lb 6.4 oz (163.5 kg)   17 (!) 361 lb (163.7 kg)              Today, you had the following     No orders found for display         Today's Medication Changes          These changes are accurate as of 18 12:20 PM.  If you have any questions, ask your nurse or doctor.               Start taking these medicines.        Dose/Directions    cephALEXin 500 MG capsule   Commonly known as:  KEFLEX   Used for:  Cellulitis of left lower extremity   Started by:  Li Marcos CNP        Dose:  500 mg   Take 1 capsule (500 mg) by mouth 4 times daily   Quantity:  40 capsule   Refills:  0            Where to get your medicines      These medications were sent to [facility] - [place], [place] - [address]  [address] [facility] [address]     Phone:  [phone]     cephALEXin 500 MG " capsule                Primary Care Provider Office Phone # Fax #    Antonio Wolff [phone] [address]  [facility] [address]        Equal Access to Services     MAVERICKJABIER JAY JAY : Jose toi kenney zora Rich, walukaszda luqsatnam, qaybta kailiada genie, ronda pro lasteffifredo imani. [facility] [phone].    ATENCIÓN: Si habla español, tiene a casillas disposición servicios gratuitos de asistencia lingüística. Llame al [phone].    We comply with applicable federal civil rights laws and [place] laws. We do not discriminate on the basis of race, color, national origin, age, disability, sex, sexual orientation, or gender identity.            Thank you!     Thank you for choosing [facility] AND [facility]  for your care. Our goal is always to provide you with excellent care. Hearing back from our patients is one way we can continue to improve our services. Please take a few minutes to complete the written survey that you may receive in the mail after your visit with us. Thank you!             Your Updated Medication List - Protect others around you: Learn how to safely use, store and throw away your medicines at www.disposemymeds.org.          This list is accurate as of 8/4/18 12:20 PM.  Always use your most recent med list.                   Brand Name Dispense Instructions for use Diagnosis    blood glucose monitoring lancets           BLOOD GLUCOSE TEST STRIPS Strp      Test two times a day        cephALEXin 500 MG capsule    KEFLEX    40 capsule    Take 1 capsule (500 mg) by mouth 4 times daily    Cellulitis of left lower extremity       clindamycin 300 MG capsule    CLEOCIN     Take 300 mg by mouth        furosemide 20 MG tablet    LASIX     Take 20 mg by mouth daily        glimepiride 1 MG tablet    AMARYL     Take 1 tablet by mouth daily        hydrOXYzine 25 MG capsule    VISTARIL     Take 1 capsule by mouth as needed for itching        levothyroxine 175  MCG tablet    SYNTHROID/LEVOTHROID     Take 175 mcg by mouth daily        spironolactone 50 MG tablet    ALDACTONE     Take 50 mg by mouth        vitamin D3 1000 units Caps      Take 1,000 Units by mouth daily        vitamin E 400 UNIT capsule      Take 800 Units by mouth daily

## 2018-08-20 ENCOUNTER — TRANSFERRED RECORDS (OUTPATIENT)
Dept: HEALTH INFORMATION MANAGEMENT | Facility: OTHER | Age: 38
End: 2018-08-20

## 2018-09-07 ENCOUNTER — TRANSFERRED RECORDS (OUTPATIENT)
Dept: HEALTH INFORMATION MANAGEMENT | Facility: OTHER | Age: 38
End: 2018-09-07

## 2018-11-16 ENCOUNTER — TRANSFERRED RECORDS (OUTPATIENT)
Dept: HEALTH INFORMATION MANAGEMENT | Facility: OTHER | Age: 38
End: 2018-11-16

## 2019-02-25 ENCOUNTER — TRANSFERRED RECORDS (OUTPATIENT)
Dept: HEALTH INFORMATION MANAGEMENT | Facility: OTHER | Age: 39
End: 2019-02-25

## 2019-05-29 ENCOUNTER — TRANSFERRED RECORDS (OUTPATIENT)
Dept: HEALTH INFORMATION MANAGEMENT | Facility: OTHER | Age: 39
End: 2019-05-29

## 2019-08-30 ENCOUNTER — TRANSFERRED RECORDS (OUTPATIENT)
Dept: HEALTH INFORMATION MANAGEMENT | Facility: OTHER | Age: 39
End: 2019-08-30

## 2019-11-02 ENCOUNTER — HEALTH MAINTENANCE LETTER (OUTPATIENT)
Age: 39
End: 2019-11-02

## 2020-01-18 ENCOUNTER — OFFICE VISIT (OUTPATIENT)
Dept: FAMILY MEDICINE | Facility: OTHER | Age: 40
End: 2020-01-18
Attending: PHYSICIAN ASSISTANT
Payer: COMMERCIAL

## 2020-01-18 VITALS
SYSTOLIC BLOOD PRESSURE: 134 MMHG | HEIGHT: 72 IN | TEMPERATURE: 98.2 F | OXYGEN SATURATION: 97 % | HEART RATE: 113 BPM | RESPIRATION RATE: 18 BRPM | WEIGHT: 315 LBS | DIASTOLIC BLOOD PRESSURE: 78 MMHG | BODY MASS INDEX: 42.66 KG/M2

## 2020-01-18 DIAGNOSIS — L03.116 CELLULITIS OF LEFT LOWER EXTREMITY: Primary | ICD-10-CM

## 2020-01-18 PROCEDURE — 99213 OFFICE O/P EST LOW 20 MIN: CPT | Performed by: PHYSICIAN ASSISTANT

## 2020-01-18 RX ORDER — CEPHALEXIN 500 MG/1
1000 CAPSULE ORAL 2 TIMES DAILY
Qty: 40 CAPSULE | Refills: 0 | Status: SHIPPED | OUTPATIENT
Start: 2020-01-18 | End: 2023-12-12

## 2020-01-18 ASSESSMENT — ENCOUNTER SYMPTOMS
VOMITING: 0
FEVER: 0
ACTIVITY CHANGE: 0
RHINORRHEA: 0
CHILLS: 0
APPETITE CHANGE: 0
ARTHRALGIAS: 0
NAUSEA: 0
FATIGUE: 0
COUGH: 0

## 2020-01-18 ASSESSMENT — PAIN SCALES - GENERAL: PAINLEVEL: NO PAIN (1)

## 2020-01-18 ASSESSMENT — MIFFLIN-ST. JEOR: SCORE: 2625.84

## 2020-01-18 NOTE — NURSING NOTE
Chief Complaint   Patient presents with     Derm Problem     Patient is here for warmth and redness on his lower left leg that started this morning. Patient states he has had cellulitis before.    Initial /78   Pulse 113   Temp 99.5  F (37.5  C) (Tympanic)   Resp 18   Ht 1.829 m (6')   Wt (!) 168.3 kg (371 lb)   SpO2 97%   BMI 50.32 kg/m   Estimated body mass index is 50.32 kg/m  as calculated from the following:    Height as of this encounter: 1.829 m (6').    Weight as of this encounter: 168.3 kg (371 lb).  Medication Reconciliation: complete    Inocencia Blanco LPN

## 2020-01-18 NOTE — PROGRESS NOTES
SUBJECTIVE:   Jason Suarez is a 39 year old male presenting with a chief complaint of   Chief Complaint   Patient presents with     Derm Problem     Nursing Notes:   Inocencia Blanco LPN  1/18/2020  1:28 PM  Signed  Chief Complaint   Patient presents with     Derm Problem     Patient is here for warmth and redness on his lower left leg that started this morning. Patient states he has had cellulitis before.    Initial /78   Pulse 113   Temp 99.5  F (37.5  C) (Tympanic)   Resp 18   Ht 1.829 m (6')   Wt (!) 168.3 kg (371 lb)   SpO2 97%   BMI 50.32 kg/m    Estimated body mass index is 50.32 kg/m  as calculated from the following:    Height as of this encounter: 1.829 m (6').    Weight as of this encounter: 168.3 kg (371 lb).  Medication Reconciliation: complete    Inocencia Blanco LPN    HPI:   Jason is presenting to [facility] with complaints of a red, slightly tender area of his skin on the back of the left leg that he noticed this morning.  He has a history of cellulitis of the lower leg and abdomen on 2 prior occasions. No break in the skin that he has noticed.  He has been feeling fine otherwise.  No pain in the leg or difficulty walking. No fever, chills, malaise, nausea, vomiting, headache.  He has been using neosporin on the leg.     He has diabetes, noninsulin dependent. His morning blood sugars run around 200.  He gets his care through the [facility] system but admits he doesn't really have a primary there. He basically just sees specialists.  He has Dr. Wolff listed here but doesn't come her for non urgent care for the most part, he says. He has h/o thyroid cancer with thyroidectomy.  Other than his DM, he does not have any immunocompromising conditions.  He is not currently undergoing chemotherapy or on prednisone.      Rechecked temperature 98.2        Review of Systems   Constitutional: Negative for activity change, appetite change, chills, fatigue and fever.   HENT: Negative for congestion  and rhinorrhea.    Respiratory: Negative for cough.    Cardiovascular: Negative for chest pain.   Gastrointestinal: Negative for nausea and vomiting.   Musculoskeletal: Negative for arthralgias.       Past Medical History:   Diagnosis Date     Acute pancreatitis without infection or necrosis     H/O gallbladder pancreatitis     Closed fracture of shaft of left humerus     Bilateral arm fractures as a child     Gastro-esophageal reflux disease without esophagitis     No Comments Provided     Malignant neoplasm of thyroid gland (H)     No Comments Provided     Multiple endocrine neoplasia (MEN) type IIA (H)     No Comments Provided     Obesity     No Comments Provided     Other fracture of left lower leg, initial encounter for closed fracture     No Comments Provided     Umbilical hernia without obstruction or gangrene     hernia repair 7/10/14     Family History   Problem Relation Age of Onset     Cancer Father         Cancer,Thyroid cancer.     Current Outpatient Medications   Medication Sig Dispense Refill             Cholecalciferol (VITAMIN D3) 1000 UNITS CAPS Take 1,000 Units by mouth daily       furosemide (LASIX) 20 MG tablet Take 20 mg by mouth daily       glimepiride (AMARYL) 1 MG tablet Take 0.5 tablets by mouth daily   3     hydrOXYzine (VISTARIL) 25 MG capsule Take 1 capsule by mouth as needed for itching  3     levothyroxine (SYNTHROID/LEVOTHROID) 175 MCG tablet Take 175 mcg by mouth daily       rifaximin (XIFAXAN) 550 MG TABS tablet Take 550 mg by mouth 2 times daily       spironolactone (ALDACTONE) 50 MG tablet Take 50 mg by mouth       vitamin E 400 UNIT capsule Take 800 Units by mouth daily       blood glucose monitoring (ONE TOUCH DELICA) lancets                clindamycin (CLEOCIN) 300 MG capsule Take 300 mg by mouth       Glucose Blood (BLOOD GLUCOSE TEST STRIPS) STRP Test two times a day        No Known Allergies    Social History     Tobacco Use     Smoking status: Former Smoker     Packs/day:  1.00     Years: 12.00     Pack years: 12.00     Types: Cigarettes     Last attempt to quit: 2015     Years since quittin.6     Smokeless tobacco: Never Used   Substance Use Topics     Alcohol use: No       OBJECTIVE  /78   Pulse 113   Temp 98.2  F (36.8  C) (Oral)   Resp 18   Ht 1.829 m (6')   Wt (!) 168.3 kg (371 lb)   SpO2 97%   BMI 50.32 kg/m      Physical Exam  Constitutional:       General: He is not in acute distress.     Appearance: He is obese.   Cardiovascular:      Rate and Rhythm: Normal rate and regular rhythm.      Heart sounds: No murmur.   Pulmonary:      Effort: Pulmonary effort is normal.      Breath sounds: Normal breath sounds.   Skin:     Comments: Fairly well-demarcated erythematous macular rash on posterior left leg, above the knee.  This measures approx 10 cm and is outlined by black marker (previously done by patient this morning and stable).  Nontender and warm.   He has a few papular lesions on the back of the upper leg, a mild folliculitis type of rash, appearing chronic but may have been a source of infection entry.     Neurological:      Mental Status: He is alert.         Labs:  No results found for this or any previous visit (from the past 24 hour(s)).        ASSESSMENT:      ICD-10-CM    1. Cellulitis of left lower extremity L03.116 cephALEXin (KEFLEX) 500 MG capsule        PLAN:  Treat with keflex x 10 days. Discussed use of antibiotics and importance of finishing course.  He can stop the neosporin as this will not help. If this rash is not showing significant improvement in 48 hours, he should be re-evaluated.  He should work on better glycemic control (sees CDE) and establish with a primary care provider.  If he gets fever, malaise/lethargy, worsening/spreading redness of the rash, vomiting, or any other concern, he should go to the emergency room.  He understands and agrees with this plan. Arlen Mo PA-C on 2020 at 3:11 PM        Patient  Instructions   Patient Education     Discharge Instructions for Cellulitis  You have been diagnosed with cellulitis. This is an infection in the deepest layer of the skin and tissue beneath the skin. In some cases, the infection also affects the muscle. Cellulitis is caused by bacteria. The bacteria can enter the body through broken skin. This can happen with a cut, scratch, animal bite, or an insect bite that has been scratched. You may have been treated in the hospital with antibiotics and fluids. You will likely be given a prescription for antibiotics to take at home. This sheet will help you take care of yourself at home.  Home care  When you are home:    Take the prescribed antibiotic medicine you are given as directed until it is gone. Take it even if you feel better. It treats the infection and stops it from returning. Not taking all the medicine can make future infections hard to treat.    Keep the infected area clean.    When possible, raise the infected area above the level of your heart. This helps keep swelling down.    Talk with your healthcare provider if you are in pain. Ask what kind of over-the-counter medicine you can take for pain.    Apply clean bandages as advised.    Take your temperature once a day for a week.    Wash your hands often to prevent spreading the infection.  In the future, wash your hands before and after you touch cuts, scratches, or bandages. This will help prevent infection.   When to call your healthcare provider  Call your healthcare provider right away if you have any of the following:    Trouble or pain when moving the joints above or below the infected area    Discharge or pus draining from the area    Fever of 100.4 F (38 C) or higher, or as directed by your healthcare provider    Pain that gets worse in or around the infected     Redness that gets worse in or around the infected area, particularly if the area of redness expands to a wider area    Shaking  chills    Swelling of the infected area    Vomiting  Date Last Reviewed: 8/1/2016 2000-2019 [employer]. [address] [place] [address], [place] [address]. All rights reserved. This information is not intended as a substitute for professional medical care. Always follow your healthcare professional's instructions.

## 2020-01-22 ENCOUNTER — TRANSFERRED RECORDS (OUTPATIENT)
Dept: HEALTH INFORMATION MANAGEMENT | Facility: OTHER | Age: 40
End: 2020-01-22

## 2020-03-12 ENCOUNTER — TRANSFERRED RECORDS (OUTPATIENT)
Dept: HEALTH INFORMATION MANAGEMENT | Facility: OTHER | Age: 40
End: 2020-03-12

## 2020-03-12 LAB
ALT SERPL-CCNC: 67 IU/L (ref 6–40)
AST SERPL-CCNC: 46 IU/L (ref 10–40)
CREAT SERPL-MCNC: 0.94 MG/DL (ref 0.7–1.2)
GFR SERPL CREATININE-BSD FRML MDRD: >60 ML/MIN/1.73M2
GLUCOSE SERPL-MCNC: 310 MG/DL (ref 70–99)
INR PPP: 1.3 (ref 0.9–1.1)
POTASSIUM SERPL-SCNC: 4 MEQ/L (ref 3.4–5.1)

## 2020-03-13 ENCOUNTER — TRANSFERRED RECORDS (OUTPATIENT)
Dept: HEALTH INFORMATION MANAGEMENT | Facility: OTHER | Age: 40
End: 2020-03-13

## 2020-05-21 ENCOUNTER — TRANSFERRED RECORDS (OUTPATIENT)
Dept: HEALTH INFORMATION MANAGEMENT | Facility: OTHER | Age: 40
End: 2020-05-21

## 2020-05-21 LAB
ALT SERPL-CCNC: 63 IU/L (ref 6–40)
AST SERPL-CCNC: 57 IU/L (ref 10–40)
CREAT SERPL-MCNC: 0.83 MG/DL (ref 0.7–1.2)
GFR SERPL CREATININE-BSD FRML MDRD: >60 ML/MIN/1.73M2
GLUCOSE SERPL-MCNC: 172 MG/DL (ref 70–99)
INR PPP: 1.3 (ref 0.9–1.1)
POTASSIUM SERPL-SCNC: 4.2 MEQ/L (ref 3.4–5.1)

## 2020-05-28 ENCOUNTER — TRANSFERRED RECORDS (OUTPATIENT)
Dept: HEALTH INFORMATION MANAGEMENT | Facility: OTHER | Age: 40
End: 2020-05-28

## 2020-09-30 ENCOUNTER — TRANSFERRED RECORDS (OUTPATIENT)
Dept: HEALTH INFORMATION MANAGEMENT | Facility: OTHER | Age: 40
End: 2020-09-30

## 2020-10-09 ENCOUNTER — TRANSFERRED RECORDS (OUTPATIENT)
Dept: HEALTH INFORMATION MANAGEMENT | Facility: OTHER | Age: 40
End: 2020-10-09

## 2020-11-14 ENCOUNTER — HEALTH MAINTENANCE LETTER (OUTPATIENT)
Age: 40
End: 2020-11-14

## 2021-01-27 ENCOUNTER — TRANSFERRED RECORDS (OUTPATIENT)
Dept: HEALTH INFORMATION MANAGEMENT | Facility: OTHER | Age: 41
End: 2021-01-27

## 2021-01-27 LAB — TSH SERPL-ACNC: 4.27 UIU/ML (ref 0.4–3.99)

## 2021-05-23 ENCOUNTER — HEALTH MAINTENANCE LETTER (OUTPATIENT)
Age: 41
End: 2021-05-23

## 2021-09-12 ENCOUNTER — HEALTH MAINTENANCE LETTER (OUTPATIENT)
Age: 41
End: 2021-09-12

## 2022-01-02 ENCOUNTER — HEALTH MAINTENANCE LETTER (OUTPATIENT)
Age: 42
End: 2022-01-02

## 2022-08-14 ENCOUNTER — HEALTH MAINTENANCE LETTER (OUTPATIENT)
Age: 42
End: 2022-08-14

## 2022-10-22 ENCOUNTER — OFFICE VISIT (OUTPATIENT)
Dept: FAMILY MEDICINE | Facility: OTHER | Age: 42
End: 2022-10-22
Attending: FAMILY MEDICINE
Payer: COMMERCIAL

## 2022-10-22 VITALS
WEIGHT: 315 LBS | RESPIRATION RATE: 18 BRPM | HEIGHT: 72 IN | HEART RATE: 88 BPM | BODY MASS INDEX: 42.66 KG/M2 | TEMPERATURE: 97.4 F | DIASTOLIC BLOOD PRESSURE: 68 MMHG | SYSTOLIC BLOOD PRESSURE: 122 MMHG

## 2022-10-22 DIAGNOSIS — E66.01 MORBID OBESITY (H): ICD-10-CM

## 2022-10-22 DIAGNOSIS — K74.60 CIRRHOSIS, NON-ALCOHOLIC (H): ICD-10-CM

## 2022-10-22 DIAGNOSIS — L03.311 CELLULITIS OF ABDOMINAL WALL: Primary | ICD-10-CM

## 2022-10-22 PROCEDURE — 99214 OFFICE O/P EST MOD 30 MIN: CPT | Performed by: FAMILY MEDICINE

## 2022-10-22 RX ORDER — CARVEDILOL 3.12 MG/1
3.12 TABLET ORAL 2 TIMES DAILY WITH MEALS
COMMUNITY
Start: 2022-09-13

## 2022-10-22 RX ORDER — METFORMIN HCL 500 MG
1000 TABLET, EXTENDED RELEASE 24 HR ORAL 2 TIMES DAILY
COMMUNITY
Start: 2022-10-10

## 2022-10-22 RX ORDER — CEPHALEXIN 500 MG/1
500 CAPSULE ORAL 4 TIMES DAILY
Qty: 56 CAPSULE | Refills: 0 | Status: SHIPPED | OUTPATIENT
Start: 2022-10-22 | End: 2023-12-12

## 2022-10-22 RX ORDER — OMEPRAZOLE 40 MG/1
1 CAPSULE, DELAYED RELEASE ORAL DAILY
COMMUNITY
Start: 2022-10-10

## 2022-10-22 RX ORDER — GINSENG 100 MG
1 CAPSULE ORAL DAILY
COMMUNITY
Start: 2022-07-25

## 2022-10-22 RX ORDER — LEVOTHYROXINE SODIUM 125 UG/1
2 TABLET ORAL
COMMUNITY
Start: 2022-09-11

## 2022-10-22 RX ORDER — LACTULOSE 10 G/15ML
SOLUTION ORAL
COMMUNITY
Start: 2022-09-26

## 2022-10-22 RX ORDER — FLASH GLUCOSE SENSOR
KIT MISCELLANEOUS
COMMUNITY
Start: 2022-07-26

## 2022-10-22 RX ORDER — CANAGLIFLOZIN 100 MG/1
TABLET, FILM COATED ORAL
COMMUNITY
Start: 2022-03-25 | End: 2023-12-12

## 2022-10-22 ASSESSMENT — PAIN SCALES - GENERAL: PAINLEVEL: MILD PAIN (2)

## 2022-10-22 NOTE — PROGRESS NOTES
(L03.311) Cellulitis of abdominal wall  (primary encounter diagnosis)  Comment:   Plan: cephALEXin (KEFLEX) 500 MG capsule            (E66.01) Morbid obesity (H)  Comment:   Plan:     (K74.60) Cirrhosis, non-alcoholic (H)  Comment:   Plan:       This patient has underlying medical conditions including diabetes, cirrhosis, obesity.    He is prone to cellulitis and recognizes the symptoms.    Right now he has redness and tenderness in the lower abdominal wall.  He has not noticed fevers or chills.      REVIEW OF SYSTEMS    No shortness of breath or cough.  No chest pain.  No vomiting or diarrhea.  No unusual weakness.      EXAM  /68   Pulse 88   Temp 97.4  F (36.3  C) (Tympanic)   Resp 18   Ht 1.829 m (6')   Wt (!) 154.9 kg (341 lb 9.6 oz)   BMI 46.33 kg/m      Large man, NAD.  HEENT unremarkable.  Nonlabored breathing.  Abdomen is protuberant, from mid abdomen out onto the flanks and down to the inguinal area there is redness and warmth of the skin.  It is not extremely indurated or extremely tender, however.  Patient is ambulating comfortably.    Some lab reviewed from this year and he has normal renal function.

## 2022-10-22 NOTE — PATIENT INSTRUCTIONS
Take prescribed medication as directed.    Observe carefully.    Return or go to [facility] if worsening fever, pain, redness, swelling.

## 2022-10-22 NOTE — NURSING NOTE
Chief Complaint   Patient presents with     Derm Problem     Patient is here for a rash on his abdomen that started yesterday.    Initial /68   Pulse 88   Temp 97.4  F (36.3  C) (Tympanic)   Resp 18   Ht 1.829 m (6')   Wt (!) 154.9 kg (341 lb 9.6 oz)   BMI 46.33 kg/m   Estimated body mass index is 46.33 kg/m  as calculated from the following:    Height as of this encounter: 1.829 m (6').    Weight as of this encounter: 154.9 kg (341 lb 9.6 oz).  Medication Reconciliation: complete    Inocencia Blanco LPN

## 2022-11-19 ENCOUNTER — HEALTH MAINTENANCE LETTER (OUTPATIENT)
Age: 42
End: 2022-11-19

## 2023-03-31 ENCOUNTER — HOSPITAL ENCOUNTER (EMERGENCY)
Facility: OTHER | Age: 43
Discharge: HOME | End: 2023-03-31
Attending: EMERGENCY MEDICINE | Admitting: EMERGENCY MEDICINE
Payer: COMMERCIAL

## 2023-03-31 VITALS
HEIGHT: 72 IN | BODY MASS INDEX: 42.66 KG/M2 | HEART RATE: 95 BPM | TEMPERATURE: 99.1 F | WEIGHT: 315 LBS | RESPIRATION RATE: 16 BRPM | SYSTOLIC BLOOD PRESSURE: 163 MMHG | DIASTOLIC BLOOD PRESSURE: 70 MMHG | OXYGEN SATURATION: 95 %

## 2023-03-31 DIAGNOSIS — L03.116 CELLULITIS OF LEFT LOWER EXTREMITY: ICD-10-CM

## 2023-03-31 PROCEDURE — 99283 EMERGENCY DEPT VISIT LOW MDM: CPT | Performed by: EMERGENCY MEDICINE

## 2023-03-31 PROCEDURE — 250N000013 HC RX MED GY IP 250 OP 250 PS 637: Performed by: EMERGENCY MEDICINE

## 2023-03-31 RX ORDER — CEPHALEXIN 500 MG/1
500 CAPSULE ORAL 4 TIMES DAILY
Qty: 28 CAPSULE | Refills: 0 | Status: SHIPPED | OUTPATIENT
Start: 2023-03-31 | End: 2023-04-07

## 2023-03-31 RX ADMIN — CEPHALEXIN 500 MG: 250 CAPSULE ORAL at 23:19

## 2023-03-31 ASSESSMENT — ACTIVITIES OF DAILY LIVING (ADL): ADLS_ACUITY_SCORE: 33

## 2023-04-01 NOTE — ED PROVIDER NOTES
Emergency Department Provider Note  : 1980 Age: 42 year old Sex: male MRN: [id number]    Chief Complaint   Patient presents with     Cellulitis       Medical Decision Making / Assessment / Plan   42 year old male presenting with cellulitis on the posterior and medial side of his left knee.  Patient had this before.  He has never had MRSA.  We will give him a dose of antibiotics here and prescribe antibiotics to the pharmacy.  Patient knows what to look for and things that he would need to seek further medical attention for.      Final diagnoses:   Cellulitis of left lower extremity       Jerry Oropeza MD  3/31/2023   Emergency Department    Subjective   Jason is a 42 year old male who presents warmth and burning sensation to his left medial and posterior knee.  Patient noticed symptoms jennifer the last couple of hours and is concerned that he has cellulitis.  He has had this before in his lower extremity.  Systemically he is otherwise feeling well and has no other complaints.  Denies a history of MRSA.    I have reviewed the Medications, Allergies, Past Medical and Surgical History, and Social History in the [facility] and with family.    Objective   BP: (!) 163/70  Pulse: 95  Temp: 99.1  F (37.3  C)  Resp: 17  Height: 182.9 cm (6')  Weight: 147.9 kg (326 lb)  SpO2: 95 %    Physical Exam:   General: awake and alert  Eyes: conjugate gaze  ENT: moist mucous membranes  Chest/Respiratory: normal resp effort  Cardiovascular: warm and well perfused  Extremities: mobility at baseline  Neurological: moving all extremities, normal speech, gait at baseline  Skin: Approximately hand sized area of erythema, induration, tenderness with raw appearing skin over the left medial and posterior knee  Psychiatric: appropriate affect        Medical/Surgical History:  Past Medical History:   Diagnosis Date     Acute pancreatitis without infection or necrosis     H/O gallbladder pancreatitis     Closed fracture of shaft of  left humerus     Bilateral arm fractures as a child     Gastro-esophageal reflux disease without esophagitis     No Comments Provided     Malignant neoplasm of thyroid gland (H)     No Comments Provided     Multiple endocrine neoplasia (MEN) type IIA (H)     No Comments Provided     Obesity     No Comments Provided     Other fracture of left lower leg, initial encounter for closed fracture     No Comments Provided     Umbilical hernia without obstruction or gangrene     hernia repair 7/10/14     Past Surgical History:   Procedure Laterality Date     CLOSED REDUCTION ANKLE      ORIF lt ankle     LAPAROSCOPIC CHOLECYSTECTOMY      7/10/14,Cholecystectomy,Laparoscopic, liver biopsy, [facility] without mesh     OTHER SURGICAL HISTORY      206641,REMOVE,and removal of syndesmotic screw.     THYROIDECTOMY      2010       Medications:  Current Facility-Administered Medications   Medication     cephALEXin (KEFLEX) capsule 500 mg     Current Outpatient Medications   Medication     cephALEXin (KEFLEX) 500 MG capsule     blood glucose monitoring (ONE TOUCH DELICA) lancets     carvedilol (COREG) 3.125 MG tablet     cephALEXin (KEFLEX) 500 MG capsule     cephALEXin (KEFLEX) 500 MG capsule     cephALEXin (KEFLEX) 500 MG capsule     Cholecalciferol (VITAMIN D3) 1000 UNITS CAPS     cholecalciferol 50 MCG (2000 UT) tablet     clindamycin (CLEOCIN) 300 MG capsule     Continuous Blood Gluc Sensor (FREESTYLE EARLINE 14 DAY SENSOR) MISC     furosemide (LASIX) 20 MG tablet     glimepiride (AMARYL) 1 MG tablet     Glucose Blood (BLOOD GLUCOSE TEST STRIPS) STRP     hydrOXYzine (VISTARIL) 25 MG capsule     INVOKANA 100 MG tablet     lactulose (CHRONULAC) 10 GM/15ML solution     levothyroxine (SYNTHROID/LEVOTHROID) 125 MCG tablet     levothyroxine (SYNTHROID/LEVOTHROID) 175 MCG tablet     metFORMIN (GLUCOPHAGE XR) 500 MG 24 hr tablet     omeprazole (PRILOSEC) 40 MG DR capsule     rifaximin (XIFAXAN) 550 MG TABS tablet     spironolactone (ALDACTONE) 50  MG tablet     vitamin E 400 UNIT capsule     zinc 50 MG TABS       Allergies:  Patient has no known allergies.    Relevant labs, images, EKGs, Epic and outside hospital (if applicable) charts were reviewed. The findings, diagnosis, plan, and need for follow up were discussed with the patient/family. Nursing notes were reviewed.        Jerry Oropeza MD  03/31/23 [id number]

## 2023-04-01 NOTE — ED TRIAGE NOTES
Pt states he believes he has cellulitis behind his left upper knee.  Pt reports warm and burning.  Pt reports this starting an hour ago and has not looked at it yet.  Pt has a hx of cellulitis and a type 2 diabetic and has a dexcom on left arm, limb alert band applied.     Triage Assessment     Row Name 03/31/23 [id number]       Triage Assessment (Adult)    Airway WDL WDL       Respiratory WDL    Respiratory WDL WDL       Cardiac WDL    Cardiac WDL WDL       Cognitive/Neuro/Behavioral WDL    Cognitive/Neuro/Behavioral WDL WDL

## 2023-04-09 ENCOUNTER — HEALTH MAINTENANCE LETTER (OUTPATIENT)
Age: 43
End: 2023-04-09

## 2023-05-19 NOTE — PROGRESS NOTES
Patient Information     Patient Name MRN Sex     Jason Suarez [id number] Male 1980      Progress Notes by Antonio Wolff MD at 2017  4:15 PM     Author:  Antonio Wolff MD Service:  (none) Author Type:  Physician     Filed:  2017  8:39 AM Encounter Date:  2017 Status:  Signed     :  Antonio Wolff MD (Physician)            SUBJECTIVE:    Jason Suarez is a 37 y.o. male who presents for physical    HPI Comments: Patient arrives here for a physical. Currently his only complaint is a rash on his left leg. Patient does have a history of MEN2a and also micronodular cirrhosis. He advised me that he is post to be seeing endocrinology and gastroenterology on a six-month basis because he has not had insurance he has not seen them for over 2 years. He recently obtained insurance. He has not had a TSH done. As far as the rash is been there for a number of months. Itching all the time. He's not tried any lotions.       No Known Allergies,   Family History       Problem   Relation Age of Onset     Cancer  Father      Thyroid cancer.     ,   No current outpatient prescriptions on file prior to visit.     No current facility-administered medications on file prior to visit.    ,   Past Surgical History:      Procedure  Laterality Date     ANKLE FRACTURE TREATMENT      ORIF lt ankle        LAP CHOLECYSTECTOMY  7/10/14    Cholecystectomy,Laparoscopic, liver biopsy, [facility] without mesh       REMOVE      and removal of syndesmotic screw.       THYROIDECTOMY           ,   Social History      Substance Use Topics        Smoking status:  Former Smoker     Packs/day: 1.00     Years: 12.     Types: Cigarettes     Quit date: 2015     Smokeless tobacco:  Never Used     Alcohol use  No    and   Social History      Substance Use Topics        Smoking status:  Former Smoker     Packs/day: 1.00     Years: 12.00     Types: Cigarettes     Quit date: 2015     Smokeless tobacco:  Never Used      Alcohol use  No       REVIEW OF SYSTEMS:  ROS    OBJECTIVE:  /68  Pulse 84  Ht 1.829 m (6')  Wt (!) 167 kg (368 lb 3.2 oz)  BMI 49.94 kg/m2    EXAM:   Physical Exam   Constitutional: He is well-developed, well-nourished, and in no distress.   HENT:   Head: Normocephalic.   Right Ear: External ear normal.   Left Ear: External ear normal.   Eyes: Pupils are equal, round, and reactive to light.   Neck: Normal range of motion. No thyromegaly present.   Cardiovascular: Normal rate, regular rhythm and normal heart sounds.    No murmur heard.  Pulmonary/Chest: Effort normal and breath sounds normal. No respiratory distress.   Abdominal: Soft.   Musculoskeletal: Normal range of motion.   Lymphadenopathy:     He has no cervical adenopathy.   Neurological: He is alert.   Skin:   Left leg does show excoriations some mild scaling.    Psychiatric: Affect normal.     Results for orders placed or performed in visit on 06/26/17       BASIC METABOLIC PANEL       Result  Value Ref Range Status    SODIUM 135 133 - 143 mmol/L Final    POTASSIUM 3.8 3.5 - 5.1 mmol/L Final    CHLORIDE 105 98 - 107 mmol/L Final    CO2,TOTAL 24 21 - 31 mmol/L Final    ANION GAP 6 5 - 18                 Final    GLUCOSE 348 (H) 70 - 105 mg/dL Final    CALCIUM 8.4 (L) 8.6 - 10.3 mg/dL Final    BUN 6 (L) 7 - 25 mg/dL Final    CREATININE 0.69 (L) 0.70 - 1.30 mg/dL Final    BUN/CREAT RATIO           9                 Final    GFR if African American >60 >60 ml/min/1.73m2 Final    GFR if not African American >60 >60 ml/min/1.73m2 Final   TSH       Result  Value Ref Range Status    TSH 1.63 0.34 - 5.60 uIU/mL Final   LIPID PANEL       Result  Value Ref Range Status    CHOLESTEROL,TOTAL 108 <200 mg/dL Final    TRIGLYCERIDES 162 (H) <150 mg/dL Final    HDL CHOLESTEROL 29 23 - 92 mg/dL Final    NON-HDL CHOLESTEROL 79 <145 mg/dl Final    CHOL/HDL RATIO            3.72 <4.50                 Final    LDL CHOLESTEROL 47 <100 mg/dL Final    PATIENT STATUS             NON-FASTING                 Final       ASSESSMENT/PLAN:    ICD-10-CM    1. Physical exam Z00.00 BASIC METABOLIC PANEL      BASIC METABOLIC PANEL   2. CARCINOMA, THYROID GLAND, MEDULLARY C73 TSH      TSH   3. MEN 2A (multiple endocrine neoplasia, type 2A) (HC) E31.22 BASIC METABOLIC PANEL      TSH      AMB CONSULT TO ENDOCRINOLOGY      levothyroxine (SYNTHROID) 175 mcg tablet      BASIC METABOLIC PANEL      TSH   4. Screening for lipid disorders Z13.220 LIPID PANEL      levothyroxine (SYNTHROID) 175 mcg tablet      LIPID PANEL   5. Micronodular cirrhosis with steatohepatitis K74.69 AMB CONSULT TO GASTROENTEROLOGY   6. Morbid obesity due to excess calories (HC) E66.01    7. Dermatitis L30.9 triamcinolone 0.5% (ARISTOCORT) 0.5 % cream        Plan:  Satisfactory physical  referral to GI and endocrinology for follow-up  *triamcinolone cream to his lower leg.  Continue with his Synthroid         [Disc space narrowing] : Disc space narrowing [Left] : left shoulder [1st] : 1st [CMC Joint] : CMC joint [AP] : anteroposterior [Lateral] : lateral [There are no fractures, subluxations or dislocations. No significant abnormalities are seen] : There are no fractures, subluxations or dislocations. No significant abnormalities are seen [Right] : right knee [NL (0)] : extension 0 degrees [5___] : hamstring 5[unfilled]/5 [Bilateral] : hand bilaterally [Degenerative change] : Degenerative change [FreeTextEntry1] : moderate basilar thumb OA [] : no erythema [TWNoteComboBox7] : flexion 125 degrees

## 2023-11-19 ENCOUNTER — HEALTH MAINTENANCE LETTER (OUTPATIENT)
Age: 43
End: 2023-11-19

## 2023-11-29 ENCOUNTER — TRANSFERRED RECORDS (OUTPATIENT)
Dept: HEALTH INFORMATION MANAGEMENT | Facility: CLINIC | Age: 43
End: 2023-11-29
Payer: COMMERCIAL

## 2023-11-29 LAB — COLOGUARD-ABSTRACT: POSITIVE

## 2023-12-05 ENCOUNTER — MEDICAL CORRESPONDENCE (OUTPATIENT)
Dept: HEALTH INFORMATION MANAGEMENT | Facility: HOSPITAL | Age: 43
End: 2023-12-05

## 2023-12-12 ENCOUNTER — APPOINTMENT (OUTPATIENT)
Dept: CT IMAGING | Facility: HOSPITAL | Age: 43
End: 2023-12-12
Attending: PHYSICIAN ASSISTANT
Payer: COMMERCIAL

## 2023-12-12 ENCOUNTER — HOSPITAL ENCOUNTER (EMERGENCY)
Facility: HOSPITAL | Age: 43
Discharge: HOME | End: 2023-12-12
Attending: PHYSICIAN ASSISTANT | Admitting: PHYSICIAN ASSISTANT
Payer: COMMERCIAL

## 2023-12-12 VITALS
TEMPERATURE: 98 F | SYSTOLIC BLOOD PRESSURE: 139 MMHG | RESPIRATION RATE: 18 BRPM | HEART RATE: 100 BPM | OXYGEN SATURATION: 98 % | DIASTOLIC BLOOD PRESSURE: 67 MMHG

## 2023-12-12 DIAGNOSIS — I81 PORTAL VEIN THROMBOSIS: Primary | ICD-10-CM

## 2023-12-12 DIAGNOSIS — R19.7 DIARRHEA: ICD-10-CM

## 2023-12-12 DIAGNOSIS — R10.9 ABDOMINAL PAIN OF UNKNOWN ETIOLOGY: ICD-10-CM

## 2023-12-12 LAB
ALBUMIN SERPL BCG-MCNC: 3.1 G/DL (ref 3.5–5.2)
ALP SERPL-CCNC: 221 U/L (ref 40–150)
ALT SERPL W P-5'-P-CCNC: 69 U/L (ref 0–70)
ANION GAP SERPL CALCULATED.3IONS-SCNC: 10 MMOL/L (ref 7–15)
AST SERPL W P-5'-P-CCNC: 60 U/L (ref 0–45)
BASOPHILS # BLD AUTO: 0 10E3/UL (ref 0–0.2)
BASOPHILS NFR BLD AUTO: 0 %
BILIRUB SERPL-MCNC: 4.4 MG/DL
BUN SERPL-MCNC: 14.4 MG/DL (ref 6–20)
CALCIUM SERPL-MCNC: 8.9 MG/DL (ref 8.6–10)
CHLORIDE SERPL-SCNC: 106 MMOL/L (ref 98–107)
CREAT SERPL-MCNC: 0.71 MG/DL (ref 0.67–1.17)
DEPRECATED HCO3 PLAS-SCNC: 20 MMOL/L (ref 22–29)
EGFRCR SERPLBLD CKD-EPI 2021: >90 ML/MIN/1.73M2
EOSINOPHIL # BLD AUTO: 0.1 10E3/UL (ref 0–0.7)
EOSINOPHIL NFR BLD AUTO: 1 %
ERYTHROCYTE [DISTWIDTH] IN BLOOD BY AUTOMATED COUNT: 15.9 % (ref 10–15)
GLUCOSE SERPL-MCNC: 284 MG/DL (ref 70–99)
HCT VFR BLD AUTO: 42.1 % (ref 40–53)
HGB BLD-MCNC: 14.3 G/DL (ref 13.3–17.7)
HOLD SPECIMEN: NORMAL
IMM GRANULOCYTES # BLD: 0 10E3/UL
IMM GRANULOCYTES NFR BLD: 0 %
INR PPP: 1.31 (ref 0.85–1.15)
LIPASE SERPL-CCNC: 27 U/L (ref 13–60)
LYMPHOCYTES # BLD AUTO: 0.2 10E3/UL (ref 0.8–5.3)
LYMPHOCYTES NFR BLD AUTO: 2 %
MCH RBC QN AUTO: 29.2 PG (ref 26.5–33)
MCHC RBC AUTO-ENTMCNC: 34 G/DL (ref 31.5–36.5)
MCV RBC AUTO: 86 FL (ref 78–100)
MONOCYTES # BLD AUTO: 0.6 10E3/UL (ref 0–1.3)
MONOCYTES NFR BLD AUTO: 7 %
NEUTROPHILS # BLD AUTO: 7.5 10E3/UL (ref 1.6–8.3)
NEUTROPHILS NFR BLD AUTO: 90 %
NRBC # BLD AUTO: 0 10E3/UL
NRBC BLD AUTO-RTO: 0 /100
PLATELET # BLD AUTO: 72 10E3/UL (ref 150–450)
POTASSIUM SERPL-SCNC: 4.7 MMOL/L (ref 3.4–5.3)
PROT SERPL-MCNC: 6.1 G/DL (ref 6.4–8.3)
RBC # BLD AUTO: 4.9 10E6/UL (ref 4.4–5.9)
SODIUM SERPL-SCNC: 136 MMOL/L (ref 135–145)
WBC # BLD AUTO: 8.4 10E3/UL (ref 4–11)

## 2023-12-12 PROCEDURE — 250N000011 HC RX IP 250 OP 636: Performed by: PHYSICIAN ASSISTANT

## 2023-12-12 PROCEDURE — 83690 ASSAY OF LIPASE: CPT | Performed by: PHYSICIAN ASSISTANT

## 2023-12-12 PROCEDURE — 99284 EMERGENCY DEPT VISIT MOD MDM: CPT | Performed by: PHYSICIAN ASSISTANT

## 2023-12-12 PROCEDURE — 36415 COLL VENOUS BLD VENIPUNCTURE: CPT | Performed by: PHYSICIAN ASSISTANT

## 2023-12-12 PROCEDURE — 99285 EMERGENCY DEPT VISIT HI MDM: CPT | Mod: 25

## 2023-12-12 PROCEDURE — 96374 THER/PROPH/DIAG INJ IV PUSH: CPT | Mod: XU

## 2023-12-12 PROCEDURE — 96375 TX/PRO/DX INJ NEW DRUG ADDON: CPT

## 2023-12-12 PROCEDURE — 258N000003 HC RX IP 258 OP 636: Performed by: PHYSICIAN ASSISTANT

## 2023-12-12 PROCEDURE — 85610 PROTHROMBIN TIME: CPT | Performed by: PHYSICIAN ASSISTANT

## 2023-12-12 PROCEDURE — 80053 COMPREHEN METABOLIC PANEL: CPT | Performed by: PHYSICIAN ASSISTANT

## 2023-12-12 PROCEDURE — 96361 HYDRATE IV INFUSION ADD-ON: CPT

## 2023-12-12 PROCEDURE — 74177 CT ABD & PELVIS W/CONTRAST: CPT

## 2023-12-12 PROCEDURE — 85025 COMPLETE CBC W/AUTO DIFF WBC: CPT | Performed by: PHYSICIAN ASSISTANT

## 2023-12-12 RX ORDER — KETOROLAC TROMETHAMINE 30 MG/ML
30 INJECTION, SOLUTION INTRAMUSCULAR; INTRAVENOUS ONCE
Status: COMPLETED | OUTPATIENT
Start: 2023-12-12 | End: 2023-12-12

## 2023-12-12 RX ORDER — IOPAMIDOL 755 MG/ML
150 INJECTION, SOLUTION INTRAVASCULAR ONCE
Status: COMPLETED | OUTPATIENT
Start: 2023-12-12 | End: 2023-12-12

## 2023-12-12 RX ORDER — HYDROMORPHONE HYDROCHLORIDE 1 MG/ML
0.5 INJECTION, SOLUTION INTRAMUSCULAR; INTRAVENOUS; SUBCUTANEOUS
Status: COMPLETED | OUTPATIENT
Start: 2023-12-12 | End: 2023-12-12

## 2023-12-12 RX ADMIN — HYDROMORPHONE HYDROCHLORIDE 0.5 MG: 1 INJECTION, SOLUTION INTRAMUSCULAR; INTRAVENOUS; SUBCUTANEOUS at 16:12

## 2023-12-12 RX ADMIN — IOPAMIDOL 150 ML: 755 INJECTION, SOLUTION INTRAVENOUS at 15:31

## 2023-12-12 RX ADMIN — KETOROLAC TROMETHAMINE 30 MG: 30 INJECTION, SOLUTION INTRAMUSCULAR; INTRAVENOUS at 14:44

## 2023-12-12 RX ADMIN — SODIUM CHLORIDE 1000 ML: 9 INJECTION, SOLUTION INTRAVENOUS at 14:44

## 2023-12-12 ASSESSMENT — ENCOUNTER SYMPTOMS
VOMITING: 0
CARDIOVASCULAR NEGATIVE: 1
FEVER: 0
NAUSEA: 0
APPETITE CHANGE: 1
DIARRHEA: 1
RESPIRATORY NEGATIVE: 1
ABDOMINAL PAIN: 1
FATIGUE: 0

## 2023-12-12 ASSESSMENT — ACTIVITIES OF DAILY LIVING (ADL)
ADLS_ACUITY_SCORE: 35
ADLS_ACUITY_SCORE: 35

## 2023-12-12 NOTE — ED PROVIDER NOTES
History     Chief Complaint   Patient presents with    Abdominal Pain    Nausea, Vomiting, & Diarrhea     The history is provided by the patient.     Jason Suarez is a 43 year old male who presented to the emergency department ambulatory for evaluation of lower abdominal pain and watery diarrhea.  Symptoms been ongoing for approximate last 12 hours.  No vomiting.  No fevers.  Denies hematochezia, melena, hematemesis.  No recent antibiotics.  No recent hospitalizations.  Has undergone cholecystectomy in the past.  History reviewed.  Most notable for thyroid cancer as well as acute pancreatitis.  He is diabetic.  Has not had a source of the liver.    Allergies:  No Known Allergies    Problem List:    Patient Active Problem List    Diagnosis Date Noted    Morbid obesity (H) 10/22/2022     Priority: Medium    Routine general medical examination at a health care facility 07/18/2018     Priority: Medium    Esophageal reflux 01/11/2018     Priority: Medium    Type 2 diabetes mellitus without complication, without long-term current use of insulin (H) 06/29/2017     Priority: Medium    Dermatitis 06/26/2017     Priority: Medium    Screening for lipid disorders 06/26/2017     Priority: Medium    Cirrhosis, non-alcoholic (H) 01/06/2015     Priority: Medium    Micronodular cirrhosis (H) 07/10/2014     Priority: Medium    MEN 2A (multiple endocrine neoplasia, type 2A) (H) 04/07/2014     Priority: Medium    Hypothyroidism 01/16/2013     Priority: Medium    Coronary artery abnormality 05/25/2010     Priority: Medium    Family history of diabetes mellitus 05/25/2010     Priority: Medium    Fatty liver 05/25/2010     Priority: Medium    Polyp of gallbladder 05/25/2010     Priority: Medium    Genetic disorder 05/25/2010     Priority: Medium    Lymphadenopathy 05/25/2010     Priority: Medium    Medullary carcinoma of thyroid (H) 05/23/2010     Priority: Medium     Overview:   MEN2 Known Familial Mutation reported positive by [facility] 3-12-10. See scanned report      Family history of malignant neoplasm of thyroid 2010     Priority: Medium    Postoperative hypothyroidism 2010     Priority: Medium    Malignant neoplasm of thyroid gland (H) 2010     Priority: Medium    Obesity 2010     Priority: Medium        Past Medical History:    Past Medical History:   Diagnosis Date    Acute pancreatitis without infection or necrosis     Closed fracture of shaft of left humerus     Gastro-esophageal reflux disease without esophagitis     Malignant neoplasm of thyroid gland (H)     Multiple endocrine neoplasia (MEN) type IIA (H)     Obesity     Other fracture of left lower leg, initial encounter for closed fracture     Umbilical hernia without obstruction or gangrene        Past Surgical History:    Past Surgical History:   Procedure Laterality Date    CLOSED REDUCTION ANKLE      ORIF lt ankle    LAPAROSCOPIC CHOLECYSTECTOMY      7/10/14,Cholecystectomy,Laparoscopic, liver biopsy, [facility] without mesh    OTHER SURGICAL HISTORY      2066,REMOVE,and removal of syndesmotic screw.    THYROIDECTOMY             Family History:    Family History   Problem Relation Age of Onset    Cancer Father         Cancer,Thyroid cancer.       Social History:  Marital Status:   [2]  Social History     Tobacco Use    Smoking status: Former     Packs/day: 1.00     Years: 12.00     Additional pack years: 0.00     Total pack years: 12.00     Types: Cigarettes     Quit date: 2015     Years since quittin.5    Smokeless tobacco: Never   Vaping Use    Vaping Use: Never used   Substance Use Topics    Alcohol use: No    Drug use: No     Comment: Drug use: No        Medications:    empagliflozin (JARDIANCE) 25 MG TABS tablet  blood glucose monitoring (ONE TOUCH DELICA) lancets  carvedilol (COREG) 3.125 MG tablet  Cholecalciferol (VITAMIN D3) 1000 UNITS CAPS  cholecalciferol 50 MCG (2000) tablet  Continuous Blood Gluc Sensor (FREESTYLE  EARLINE 14 DAY SENSOR) MISC  furosemide (LASIX) 20 MG tablet  Glucose Blood (BLOOD GLUCOSE TEST STRIPS) STRP  hydrOXYzine (VISTARIL) 25 MG capsule  lactulose (CHRONULAC) 10 GM/15ML solution  levothyroxine (SYNTHROID/LEVOTHROID) 125 MCG tablet  metFORMIN (GLUCOPHAGE XR) 500 MG 24 hr tablet  omeprazole (PRILOSEC) 40 MG DR capsule  rifaximin (XIFAXAN) 550 MG TABS tablet  spironolactone (ALDACTONE) 50 MG tablet  vitamin E 400 UNIT capsule  zinc 50 MG TABS          Review of Systems   Constitutional:  Positive for appetite change. Negative for fatigue and fever.   Respiratory: Negative.     Cardiovascular: Negative.    Gastrointestinal:  Positive for abdominal pain and diarrhea. Negative for nausea and vomiting.   Genitourinary: Negative.    Skin: Negative.        Physical Exam   BP: 125/72  Pulse: 109  Temp: 99.6  F (37.6  C)  Resp: 20  SpO2: 98 %      Physical Exam  Vitals and nursing note reviewed.   Constitutional:       General: He is not in acute distress.     Appearance: Normal appearance. He is obese. He is not ill-appearing, toxic-appearing or diaphoretic.   Cardiovascular:      Rate and Rhythm: Regular rhythm. Tachycardia present.      Comments: Mild tachycardia  Pulmonary:      Effort: Pulmonary effort is normal.   Abdominal:      Palpations: Abdomen is soft.      Comments: Soft obese abdomen that is generalized tenderness without focality.  There is no guarding.  No rigidity   Skin:     General: Skin is warm and dry.      Capillary Refill: Capillary refill takes less than 2 seconds.   Neurological:      General: No focal deficit present.      Mental Status: He is alert and oriented to person, place, and time.   Psychiatric:         Mood and Affect: Mood normal.         ED Course              ED Course as of 12/12/23 1732 Tue Dec 12, 2023   1445 Chronic thrombocytopenia   1508 The patient has chronically elevated transaminases and bilirubin.   1634 Call to clinic for consultation.   1715 Discussed with   Beth of gastroenterology at [facility].  He advised no anticoagulation today.  Urgent outpatient Doppler of the vessels      Procedures              Critical Care time:  none               Results for orders placed or performed during the hospital encounter of 12/12/23 (from the past 24 hour(s))   CBC with Platelets & Differential    Narrative    The following orders were created for panel order CBC with Platelets & Differential.  Procedure                               Abnormality         Status                     ---------                               -----------         ------                     CBC with platelets and d...[[id number]]  Abnormal            Final result                 Please view results for these tests on the individual orders.   Comprehensive metabolic panel   Result Value Ref Range    Sodium 136 135 - 145 mmol/L    Potassium 4.7 3.4 - 5.3 mmol/L    Carbon Dioxide (CO2) 20 (L) 22 - 29 mmol/L    Anion Gap 10 7 - 15 mmol/L    Urea Nitrogen 14.4 6.0 - 20.0 mg/dL    Creatinine 0.71 0.67 - 1.17 mg/dL    GFR Estimate >90 >60 mL/min/1.73m2    Calcium 8.9 8.6 - 10.0 mg/dL    Chloride 106 98 - 107 mmol/L    Glucose 284 (H) 70 - 99 mg/dL    Alkaline Phosphatase 221 (H) 40 - 150 U/L    AST 60 (H) 0 - 45 U/L    ALT 69 0 - 70 U/L    Protein Total 6.1 (L) 6.4 - 8.3 g/dL    Albumin 3.1 (L) 3.5 - 5.2 g/dL    Bilirubin Total 4.4 (H) <=1.2 mg/dL   [place] Draw    Narrative    The following orders were created for panel order [place] Draw.  Procedure                               Abnormality         Status                     ---------                               -----------         ------                     Extra Red Top Tube[[id number]]                               Final result               Extra Green Top (Lithium...[[id number]]                      Final result               Extra Purple Top Tube[[id number]]                            Final result                 Please view results for these tests on  the individual orders.   CBC with platelets and differential   Result Value Ref Range    WBC Count 8.4 4.0 - 11.0 10e3/uL    RBC Count 4.90 4.40 - 5.90 10e6/uL    Hemoglobin 14.3 13.3 - 17.7 g/dL    Hematocrit 42.1 40.0 - 53.0 %    MCV 86 78 - 100 fL    MCH 29.2 26.5 - 33.0 pg    MCHC 34.0 31.5 - 36.5 g/dL    RDW 15.9 (H) 10.0 - 15.0 %    Platelet Count 72 (L) 150 - 450 10e3/uL    % Neutrophils 90 %    % Lymphocytes 2 %    % Monocytes 7 %    % Eosinophils 1 %    % Basophils 0 %    % Immature Granulocytes 0 %    NRBCs per 100 WBC 0 <1 /100    Absolute Neutrophils 7.5 1.6 - 8.3 10e3/uL    Absolute Lymphocytes 0.2 (L) 0.8 - 5.3 10e3/uL    Absolute Monocytes 0.6 0.0 - 1.3 10e3/uL    Absolute Eosinophils 0.1 0.0 - 0.7 10e3/uL    Absolute Basophils 0.0 0.0 - 0.2 10e3/uL    Absolute Immature Granulocytes 0.0 <=0.4 10e3/uL    Absolute NRBCs 0.0 10e3/uL   Extra Red Top Tube   Result Value Ref Range    Hold Specimen JIC    Extra Green Top (Lithium Heparin) Tube   Result Value Ref Range    Hold Specimen JIC    Extra Purple Top Tube   Result Value Ref Range    Hold Specimen JIC    Lipase   Result Value Ref Range    Lipase 27 13 - 60 U/L   CT Abdomen Pelvis w Contrast    Narrative    PROCEDURE:  CT ABDOMEN PELVIS W CONTRAST    HISTORY: Abrupt lower abdominal pain    TECHNIQUE: Helical CT of the abdomen and pelvis was performed  following injection of intravenous contrast. This CT exam was  performed using one or more the following dose reduction techniques:  automated exposure control, adjustment of the mA and/or kV according  to patient size, and/or iterative reconstruction technique.    COMPARISON: MRI 5/18/2018    MEDS/CONTRAST: Isovue 370 mL 150 given    FINDINGS:      Limited images through the lung bases demonstrate no focal  consolidation or mass.     There is massive splenomegaly, with a splenic length of 24 cm. The  portal vein is thrombosed. The liver is cirrhotic and nodular. There  are bulky perisplenic, right  retroperitoneal and mesenteric  varicosities. No acute extravasation is identified. Assessment for  arterial aneurysm or pseudoaneurysm is limited by the phase of  contrast.    The bowel is normal in caliber. Subtle diffuse bowel wall edema is  questioned in the right abdomen including the ileum and right colon.    The gallbladder is surgically absent. The pancreas and adrenal glands  are unremarkable. Symmetric nephrograms are present without  hydronephrosis.    No suspicious osseous lesions are identified.      Impression    IMPRESSION:      Cirrhosis. Portal vein thrombosis. Massive splenomegaly. Massive  varicosities.     Questionable wall thickening or edema within the ileum and right  colon. Infectious etiologies and venous/vascular congestion in the  differential.    Trace ascites.    Recommend GI consultation. Consider delayed CTA of the abdomen and  pelvis after the current contrast has cleared to assess for any  associated arterial or other shunt related lesion requiring possible  intervention.    VADIM MAYNARD MD         SYSTEM ID:  [id number]   INR   Result Value Ref Range    INR 1.31 (H) 0.85 - 1.15       Medications   sodium chloride 0.9% BOLUS 1,000 mL (0 mLs Intravenous Stopped 12/12/23 1612)   ketorolac (TORADOL) injection 30 mg (30 mg Intravenous $Given 12/12/23 1444)   iopamidol (ISOVUE-370) solution 150 mL (150 mLs Intravenous $Given 12/12/23 1531)   sodium chloride (PF) 0.9% PF flush 60 mL (60 mLs Intravenous $Given 12/12/23 1530)   HYDROmorphone (PF) (DILAUDID) injection 0.5 mg (0.5 mg Intravenous $Given 12/12/23 1612)       Assessments & Plan (with Medical Decision Making)   This is a 43-year-old male with a history of nonalcoholic cirrhosis who is followed closely by [facility] gastroenterology.  He reports developing abrupt onset of lower abdominal pain and watery diarrhea approxi-4:00 this morning.  No high risk or red flag features.  Imaging shows thrombosis of the portal vein.   Chronically elevated transaminases and elevated bilirubin.  He has no gallbladder.  No fevers.  Abdominal pain entirely resolved after pain medications.  I did call discuss his findings with on-call gastroenterology Dr. Campos at [facility].  He advised against starting anticoagulation and having the patient undergo Doppler ultrasound of the abdomen tomorrow.  This was all discussed at length with Mr. Suarez.  As stated above his symptoms have entirely resolved.  Certainly reasonable for outpatient evaluation.  Rest and hydration was discussed.  He will return here for any new or worsening symptoms or other questions or concerns whatsoever.    This document was prepared using a combination of typing and voice generated software.  While every attempt was made for accuracy, spelling and grammatical errors may exist.     I have reviewed the nursing notes.    I have reviewed the findings, diagnosis, plan and need for follow up with the patient.           Medical Decision Making  The patient's presentation was of moderate complexity (an undiagnosed new problem with uncertain diagnosis).    The patient's evaluation involved: Review of multiple previous labs  strong consideration of a test (CT angiogram of the abdomen, ultrasound of the abdomen) that was ultimately deferred  ordering and/or review of 3+ test(s) in this encounter (CT scan of the abdomen and multiple labs)  discussion of management or test interpretation with another health professional (gastroenterology at [facility])    The patient's management necessitated high risk (a parenteral controlled substance).        New Prescriptions    No medications on file       Final diagnoses:   Abdominal pain of unknown etiology   Diarrhea   Portal vein thrombosis       12/12/2023   [facility]       Lesley Flaherty PA-C  12/12/23 [id number]

## 2023-12-12 NOTE — DISCHARGE INSTRUCTIONS
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  I called and discussed this with the gastroenterology team at [facility] and they advise getting an ultrasound tomorrow.  They recommended against starting anticoagulation.  I am glad you are abdominal pain has resolved.  Rest and stay hydrated.  Please return to the emergency department for any new symptoms, worsening symptoms, or other questions or concerns.

## 2023-12-12 NOTE — ED TRIAGE NOTES
Pt states at approximately 0400 he developed sharp stabbing in abdomen, followed by nausea, vomiting and diarrhea after trying to drink fluids. Pt states he still has his appendix. Pt does report chills. Pt has a hx of fatty liver. Pt states his pain is 8/10.

## 2023-12-13 ENCOUNTER — HOSPITAL ENCOUNTER (OUTPATIENT)
Dept: ULTRASOUND IMAGING | Facility: HOSPITAL | Age: 43
Discharge: HOME | End: 2023-12-13
Attending: PHYSICIAN ASSISTANT | Admitting: PHYSICIAN ASSISTANT
Payer: COMMERCIAL

## 2023-12-13 DIAGNOSIS — I81 PORTAL VEIN THROMBOSIS: ICD-10-CM

## 2023-12-13 DIAGNOSIS — R10.9 ABDOMINAL PAIN OF UNKNOWN ETIOLOGY: ICD-10-CM

## 2023-12-13 PROCEDURE — 93976 VASCULAR STUDY: CPT

## 2023-12-13 NOTE — ED NOTES
Patient had called the ER because he had his ultrasound done this morning and wants to know what to do next.  Dr Danielson reviewed the ultrasound results and said that nothing needs to be done now and that patient can follow up with a primary doctor.  Called patient back at home and told him that nothing needs to be done now but that he needs to follow up with his Primary Provider.  Patient said that he will make appt with his Primary Provider at [facility] in [place].

## 2024-06-15 ENCOUNTER — HEALTH MAINTENANCE LETTER (OUTPATIENT)
Age: 44
End: 2024-06-15

## 2024-07-29 ENCOUNTER — HOSPITAL ENCOUNTER (EMERGENCY)
Facility: HOSPITAL | Age: 44
Discharge: HOME | End: 2024-07-29
Attending: PHYSICIAN ASSISTANT | Admitting: PHYSICIAN ASSISTANT
Payer: COMMERCIAL

## 2024-07-29 VITALS
BODY MASS INDEX: 42.66 KG/M2 | SYSTOLIC BLOOD PRESSURE: 107 MMHG | TEMPERATURE: 99 F | HEIGHT: 72 IN | WEIGHT: 315 LBS | HEART RATE: 103 BPM | OXYGEN SATURATION: 96 % | RESPIRATION RATE: 16 BRPM | DIASTOLIC BLOOD PRESSURE: 69 MMHG

## 2024-07-29 DIAGNOSIS — L03.115 CELLULITIS OF RIGHT LOWER EXTREMITY: ICD-10-CM

## 2024-07-29 PROCEDURE — 99213 OFFICE O/P EST LOW 20 MIN: CPT | Performed by: PHYSICIAN ASSISTANT

## 2024-07-29 PROCEDURE — G0463 HOSPITAL OUTPT CLINIC VISIT: HCPCS

## 2024-07-29 RX ORDER — CEFADROXIL 500 MG/1
500 CAPSULE ORAL 2 TIMES DAILY
Qty: 14 CAPSULE | Refills: 0 | Status: SHIPPED | OUTPATIENT
Start: 2024-07-29 | End: 2024-08-05

## 2024-07-29 RX ORDER — SPIRONOLACTONE 100 MG/1
TABLET, FILM COATED ORAL
COMMUNITY
Start: 2024-07-13

## 2024-07-29 ASSESSMENT — ENCOUNTER SYMPTOMS
FEVER: 1
COLOR CHANGE: 1

## 2024-07-29 ASSESSMENT — ACTIVITIES OF DAILY LIVING (ADL): ADLS_ACUITY_SCORE: 35

## 2024-07-29 NOTE — ED PROVIDER NOTES
History     Chief Complaint   Patient presents with    Leg Swelling     Right leg swelling     HPI  Jason Suarez is a 44 year old male with history of liver cirrhosis and type 2 diabetes who presents to the urgent care for evaluation of possible cellulitis to the right lower extremity.  Patient states yesterday he started feeling fatigued, and feverish.  He states that he was running a fever yesterday and also today along with increased redness, and warmth to lower right extremity.  Patient has had this in the past.  Patient states that he does feel much better today than he did yesterday.  Patient currently denies any chest pain, shortness of breath, abdominal pain, or any other associated symptoms.    Allergies:  No Known Allergies    Problem List:    Patient Active Problem List    Diagnosis Date Noted    Morbid obesity (H) 10/22/2022     Priority: Medium    Routine general medical examination at a health care facility 07/18/2018     Priority: Medium    Esophageal reflux 01/11/2018     Priority: Medium    Type 2 diabetes mellitus without complication, without long-term current use of insulin (H) 06/29/2017     Priority: Medium    Dermatitis 06/26/2017     Priority: Medium    Screening for lipid disorders 06/26/2017     Priority: Medium    Cirrhosis, non-alcoholic (H) 01/06/2015     Priority: Medium    Micronodular cirrhosis (H) 07/10/2014     Priority: Medium    MEN 2A (multiple endocrine neoplasia, type 2A) (H) 04/07/2014     Priority: Medium    Hypothyroidism 01/16/2013     Priority: Medium    Coronary artery abnormality 05/25/2010     Priority: Medium    Family history of diabetes mellitus 05/25/2010     Priority: Medium    Fatty liver 05/25/2010     Priority: Medium    Polyp of gallbladder 05/25/2010     Priority: Medium    Genetic disorder 05/25/2010     Priority: Medium    Lymphadenopathy 05/25/2010     Priority: Medium    Medullary carcinoma of thyroid (H) 05/23/2010     Priority: Medium     Overview:    MEN2 Known Familial Mutation reported positive by [facility] 3-12-10. See scanned report      Family history of malignant neoplasm of thyroid 2010     Priority: Medium    Postoperative hypothyroidism 2010     Priority: Medium    Malignant neoplasm of thyroid gland (H) 2010     Priority: Medium    Obesity 2010     Priority: Medium        Past Medical History:    Past Medical History:   Diagnosis Date    Acute pancreatitis without infection or necrosis     Closed fracture of shaft of left humerus     Gastro-esophageal reflux disease without esophagitis     Malignant neoplasm of thyroid gland (H)     Multiple endocrine neoplasia (MEN) type IIA (H)     Obesity     Other fracture of left lower leg, initial encounter for closed fracture     Umbilical hernia without obstruction or gangrene        Past Surgical History:    Past Surgical History:   Procedure Laterality Date    CLOSED REDUCTION ANKLE      ORIF lt ankle    LAPAROSCOPIC CHOLECYSTECTOMY      7/10/14,Cholecystectomy,Laparoscopic, liver biopsy, [facility] without mesh    OTHER SURGICAL HISTORY      2066,REMOVE,and removal of syndesmotic screw.    THYROIDECTOMY             Family History:    Family History   Problem Relation Age of Onset    Cancer Father         Cancer,Thyroid cancer.       Social History:  Marital Status:   [2]  Social History     Tobacco Use    Smoking status: Former     Current packs/day: 0.00     Average packs/day: 1 pack/day for 12.0 years (12.0 ttl pk-yrs)     Types: Cigarettes     Start date: 2003     Quit date: 2015     Years since quittin.1    Smokeless tobacco: Never   Vaping Use    Vaping status: Never Used   Substance Use Topics    Alcohol use: No    Drug use: No     Comment: Drug use: No        Medications:    cefadroxil (DURICEF) 500 MG capsule  spironolactone (ALDACTONE) 100 MG tablet  blood glucose monitoring (ONE TOUCH DELICA) lancets  carvedilol (COREG) 3.125 MG  tablet  Cholecalciferol (VITAMIN D3) 1000 UNITS CAPS  cholecalciferol 50 MCG (2000 UT) tablet  Continuous Blood Gluc Sensor (FREESTYLE EARLINE 14 DAY SENSOR) MISC  empagliflozin (JARDIANCE) 25 MG TABS tablet  furosemide (LASIX) 20 MG tablet  Glucose Blood (BLOOD GLUCOSE TEST STRIPS) STRP  hydrOXYzine (VISTARIL) 25 MG capsule  lactulose (CHRONULAC) 10 GM/15ML solution  levothyroxine (SYNTHROID/LEVOTHROID) 125 MCG tablet  metFORMIN (GLUCOPHAGE XR) 500 MG 24 hr tablet  omeprazole (PRILOSEC) 40 MG DR capsule  rifaximin (XIFAXAN) 550 MG TABS tablet  spironolactone (ALDACTONE) 50 MG tablet  vitamin E 400 UNIT capsule  zinc 50 MG TABS          Review of Systems   Constitutional:  Positive for fever.   Skin:  Positive for color change.   All other systems reviewed and are negative.      Physical Exam   BP: 107/69  Pulse: 103  Temp: 99  F (37.2  C)  Resp: 16  Height: 182.9 cm (6')  Weight: 144.6 kg (318 lb 12.8 oz)  SpO2: 96 %      Physical Exam  Vitals and nursing note reviewed.   Constitutional:       General: He is not in acute distress.     Appearance: Normal appearance. He is not ill-appearing, toxic-appearing or diaphoretic.   Cardiovascular:      Rate and Rhythm: Regular rhythm.      Heart sounds: Normal heart sounds.   Pulmonary:      Breath sounds: Normal breath sounds.   Abdominal:      Tenderness: There is no right CVA tenderness or left CVA tenderness.   Neurological:      Mental Status: He is alert and oriented to person, place, and time.         ED Course        Procedures             Critical Care time:               No results found for this or any previous visit (from the past 24 hour(s)).    Medications - No data to display    Assessments & Plan (with Medical Decision Making)   #1.  Cellulitis right lower extremity    Discussed exam findings with patient.  Patient was started on course of cefadroxil.  We did discuss running labs to make sure patient is septic and does not have any other emergent issues going  on with his history of cirrhosis, however, patient and his significant other declined any additional testing at this time and would just like to be prescribed antibiotics.  Patient is instructed that if his symptoms worsen he is to go to emergency department immediately.  Both patient and wife verbalized understanding and agreement of plan.        I have reviewed the nursing notes.    I have reviewed the findings, diagnosis, plan and need for follow up with the patient.          Medical Decision Making  The patient's presentation was of .    The patient's evaluation involved:      The patient's management necessitated         New Prescriptions    CEFADROXIL (DURICEF) 500 MG CAPSULE    Take 1 capsule (500 mg) by mouth 2 times daily for 7 days       Final diagnoses:   Cellulitis of right lower extremity       7/29/2024   [facility]       Eliseo Levy PA-C  07/29/24 [id number]

## 2024-07-29 NOTE — ED TRIAGE NOTES
Pt presents with c/o lower right leg swelling, pain, and redness. Sx started yesterday. Concerned about cellulitis. Denies hx of cardiac problems. CMS intact. ROM WNL. Pt was able to ambulate to room. Pt took tylenol and elevating leg.

## 2024-08-16 NOTE — PATIENT INSTRUCTIONS
On meds works with therapist and psychiatrist   Patient Education     Discharge Instructions for Cellulitis  You have been diagnosed with cellulitis. This is an infection in the deepest layer of the skin and tissue beneath the skin. In some cases, the infection also affects the muscle. Cellulitis is caused by bacteria. The bacteria can enter the body through broken skin. This can happen with a cut, scratch, animal bite, or an insect bite that has been scratched. You may have been treated in the hospital with antibiotics and fluids. You will likely be given a prescription for antibiotics to take at home. This sheet will help you take care of yourself at home.  Home care  When you are home:    Take the prescribed antibiotic medicine you are given as directed until it is gone. Take it even if you feel better. It treats the infection and stops it from returning. Not taking all the medicine can make future infections hard to treat.    Keep the infected area clean.    When possible, raise the infected area above the level of your heart. This helps keep swelling down.    Talk with your healthcare provider if you are in pain. Ask what kind of over-the-counter medicine you can take for pain.    Apply clean bandages as advised.    Take your temperature once a day for a week.    Wash your hands often to prevent spreading the infection.  In the future, wash your hands before and after you touch cuts, scratches, or bandages. This will help prevent infection.   When to call your healthcare provider  Call your healthcare provider right away if you have any of the following:    Trouble or pain when moving the joints above or below the infected area    Discharge or pus draining from the area    Fever of 100.4 F (38 C) or higher, or as directed by your healthcare provider    Pain that gets worse in or around the infected     Redness that gets worse in or around the infected area, particularly if the area of redness expands to a wider area    Shaking chills    Swelling of the  infected area    Vomiting  Date Last Reviewed: 8/1/2016 2000-2019 [employer] [address] [place] [address], [place] [address]. All rights reserved. This information is not intended as a substitute for professional medical care. Always follow your healthcare professional's instructions.

## 2024-12-27 ENCOUNTER — APPOINTMENT (OUTPATIENT)
Dept: GENERAL RADIOLOGY | Facility: HOSPITAL | Age: 44
End: 2024-12-27
Attending: INTERNAL MEDICINE
Payer: COMMERCIAL

## 2024-12-27 ENCOUNTER — HOSPITAL ENCOUNTER (INPATIENT)
Facility: HOSPITAL | Age: 44
LOS: 3 days | Discharge: HOME | End: 2024-12-31
Attending: INTERNAL MEDICINE | Admitting: INTERNAL MEDICINE
Payer: COMMERCIAL

## 2024-12-27 DIAGNOSIS — R78.81 GRAM-POSITIVE BACTEREMIA: Primary | ICD-10-CM

## 2024-12-27 DIAGNOSIS — G93.41 SEPTIC ENCEPHALOPATHY: ICD-10-CM

## 2024-12-27 DIAGNOSIS — L03.115 CELLULITIS OF RIGHT LOWER EXTREMITY: ICD-10-CM

## 2024-12-27 LAB
ALBUMIN SERPL BCG-MCNC: 2.9 G/DL (ref 3.5–5.2)
ALBUMIN UR-MCNC: 20 MG/DL
ALP SERPL-CCNC: 245 U/L (ref 40–150)
ALT SERPL W P-5'-P-CCNC: 48 U/L (ref 0–70)
AMMONIA PLAS-SCNC: 33 UMOL/L (ref 16–60)
ANION GAP SERPL CALCULATED.3IONS-SCNC: 11 MMOL/L (ref 7–15)
APPEARANCE UR: CLEAR
AST SERPL W P-5'-P-CCNC: 37 U/L (ref 0–45)
BASOPHILS # BLD AUTO: 0 10E3/UL (ref 0–0.2)
BASOPHILS NFR BLD AUTO: 0 %
BILIRUB SERPL-MCNC: 6.1 MG/DL
BILIRUB UR QL STRIP: NEGATIVE
BUN SERPL-MCNC: 16.3 MG/DL (ref 6–20)
CALCIUM SERPL-MCNC: 9.6 MG/DL (ref 8.8–10.4)
CHLORIDE SERPL-SCNC: 98 MMOL/L (ref 98–107)
CK SERPL-CCNC: 72 U/L (ref 39–308)
COLOR UR AUTO: ABNORMAL
CREAT SERPL-MCNC: 0.87 MG/DL (ref 0.67–1.17)
CRP SERPL-MCNC: 74.3 MG/L
EGFRCR SERPLBLD CKD-EPI 2021: >90 ML/MIN/1.73M2
EOSINOPHIL # BLD AUTO: 0 10E3/UL (ref 0–0.7)
EOSINOPHIL NFR BLD AUTO: 0 %
ERYTHROCYTE [DISTWIDTH] IN BLOOD BY AUTOMATED COUNT: 17.6 % (ref 10–15)
ERYTHROCYTE [SEDIMENTATION RATE] IN BLOOD BY WESTERGREN METHOD: 13 MM/HR (ref 0–15)
FLUAV RNA SPEC QL NAA+PROBE: NEGATIVE
FLUBV RNA RESP QL NAA+PROBE: NEGATIVE
GLUCOSE SERPL-MCNC: 416 MG/DL (ref 70–99)
GLUCOSE UR STRIP-MCNC: >1000 MG/DL
HCO3 SERPL-SCNC: 20 MMOL/L (ref 22–29)
HCT VFR BLD AUTO: 37.3 % (ref 40–53)
HGB BLD-MCNC: 12.9 G/DL (ref 13.3–17.7)
HGB UR QL STRIP: NEGATIVE
HOLD SPECIMEN: NORMAL
HYALINE CASTS: 10 /LPF
IMM GRANULOCYTES # BLD: 0.1 10E3/UL
IMM GRANULOCYTES NFR BLD: 1 %
KETONES UR STRIP-MCNC: ABNORMAL MG/DL
LACTATE SERPL-SCNC: 4.5 MMOL/L (ref 0.7–2)
LEUKOCYTE ESTERASE UR QL STRIP: NEGATIVE
LYMPHOCYTES # BLD AUTO: 0.4 10E3/UL (ref 0.8–5.3)
LYMPHOCYTES NFR BLD AUTO: 4 %
MCH RBC QN AUTO: 30.6 PG (ref 26.5–33)
MCHC RBC AUTO-ENTMCNC: 34.6 G/DL (ref 31.5–36.5)
MCV RBC AUTO: 89 FL (ref 78–100)
MONOCYTES # BLD AUTO: 0.9 10E3/UL (ref 0–1.3)
MONOCYTES NFR BLD AUTO: 10 %
MUCOUS THREADS #/AREA URNS LPF: PRESENT /LPF
NEUTROPHILS # BLD AUTO: 8.3 10E3/UL (ref 1.6–8.3)
NEUTROPHILS NFR BLD AUTO: 85 %
NITRATE UR QL: NEGATIVE
NRBC # BLD AUTO: 0 10E3/UL
NRBC BLD AUTO-RTO: 0 /100
PH UR STRIP: 5 [PH] (ref 4.7–8)
PLATELET # BLD AUTO: 73 10E3/UL (ref 150–450)
POTASSIUM SERPL-SCNC: 3.5 MMOL/L (ref 3.4–5.3)
PROCALCITONIN SERPL IA-MCNC: 3.91 NG/ML
PROT SERPL-MCNC: 5.7 G/DL (ref 6.4–8.3)
RBC # BLD AUTO: 4.21 10E6/UL (ref 4.4–5.9)
RBC URINE: 0 /HPF
RSV RNA SPEC NAA+PROBE: NEGATIVE
SARS-COV-2 RNA RESP QL NAA+PROBE: NEGATIVE
SODIUM SERPL-SCNC: 129 MMOL/L (ref 135–145)
SP GR UR STRIP: 1.03 (ref 1–1.03)
SQUAMOUS EPITHELIAL: 0 /HPF
UROBILINOGEN UR STRIP-MCNC: 2 MG/DL
WBC # BLD AUTO: 9.7 10E3/UL (ref 4–11)
WBC URINE: 3 /HPF

## 2024-12-27 PROCEDURE — 87077 CULTURE AEROBIC IDENTIFY: CPT | Performed by: INTERNAL MEDICINE

## 2024-12-27 PROCEDURE — 71045 X-RAY EXAM CHEST 1 VIEW: CPT

## 2024-12-27 PROCEDURE — 83605 ASSAY OF LACTIC ACID: CPT | Performed by: INTERNAL MEDICINE

## 2024-12-27 PROCEDURE — 36415 COLL VENOUS BLD VENIPUNCTURE: CPT | Performed by: INTERNAL MEDICINE

## 2024-12-27 PROCEDURE — 87186 SC STD MICRODIL/AGAR DIL: CPT | Performed by: INTERNAL MEDICINE

## 2024-12-27 PROCEDURE — 82550 ASSAY OF CK (CPK): CPT | Performed by: INTERNAL MEDICINE

## 2024-12-27 PROCEDURE — 85004 AUTOMATED DIFF WBC COUNT: CPT | Performed by: INTERNAL MEDICINE

## 2024-12-27 PROCEDURE — 258N000003 HC RX IP 258 OP 636: Performed by: INTERNAL MEDICINE

## 2024-12-27 PROCEDURE — 82140 ASSAY OF AMMONIA: CPT | Performed by: INTERNAL MEDICINE

## 2024-12-27 PROCEDURE — 86140 C-REACTIVE PROTEIN: CPT | Performed by: INTERNAL MEDICINE

## 2024-12-27 PROCEDURE — 84145 PROCALCITONIN (PCT): CPT | Performed by: INTERNAL MEDICINE

## 2024-12-27 PROCEDURE — 250N000011 HC RX IP 250 OP 636: Performed by: INTERNAL MEDICINE

## 2024-12-27 PROCEDURE — 87040 BLOOD CULTURE FOR BACTERIA: CPT | Performed by: INTERNAL MEDICINE

## 2024-12-27 PROCEDURE — 96365 THER/PROPH/DIAG IV INF INIT: CPT | Performed by: INTERNAL MEDICINE

## 2024-12-27 PROCEDURE — 87086 URINE CULTURE/COLONY COUNT: CPT | Performed by: INTERNAL MEDICINE

## 2024-12-27 PROCEDURE — 80053 COMPREHEN METABOLIC PANEL: CPT | Performed by: INTERNAL MEDICINE

## 2024-12-27 PROCEDURE — 85014 HEMATOCRIT: CPT | Performed by: INTERNAL MEDICINE

## 2024-12-27 PROCEDURE — 96367 TX/PROPH/DG ADDL SEQ IV INF: CPT | Performed by: INTERNAL MEDICINE

## 2024-12-27 PROCEDURE — 85652 RBC SED RATE AUTOMATED: CPT | Performed by: INTERNAL MEDICINE

## 2024-12-27 PROCEDURE — 99285 EMERGENCY DEPT VISIT HI MDM: CPT | Mod: 25 | Performed by: INTERNAL MEDICINE

## 2024-12-27 PROCEDURE — 99285 EMERGENCY DEPT VISIT HI MDM: CPT | Performed by: INTERNAL MEDICINE

## 2024-12-27 PROCEDURE — 82040 ASSAY OF SERUM ALBUMIN: CPT | Performed by: INTERNAL MEDICINE

## 2024-12-27 PROCEDURE — 81001 URINALYSIS AUTO W/SCOPE: CPT | Performed by: INTERNAL MEDICINE

## 2024-12-27 PROCEDURE — 87637 SARSCOV2&INF A&B&RSV AMP PRB: CPT | Performed by: NURSE PRACTITIONER

## 2024-12-27 RX ORDER — CEFTRIAXONE SODIUM 2 G/50ML
2 INJECTION, SOLUTION INTRAVENOUS ONCE
Status: COMPLETED | OUTPATIENT
Start: 2024-12-27 | End: 2024-12-27

## 2024-12-27 RX ORDER — VANCOMYCIN HYDROCHLORIDE
1500 ONCE
Status: COMPLETED | OUTPATIENT
Start: 2024-12-27 | End: 2024-12-28

## 2024-12-27 RX ORDER — VANCOMYCIN HYDROCHLORIDE 1 G/200ML
1000 INJECTION, SOLUTION INTRAVENOUS ONCE
Status: COMPLETED | OUTPATIENT
Start: 2024-12-27 | End: 2024-12-28

## 2024-12-27 RX ADMIN — Medication 1500 MG: at 22:59

## 2024-12-27 RX ADMIN — SODIUM CHLORIDE 1000 ML: 9 INJECTION, SOLUTION INTRAVENOUS at 22:43

## 2024-12-27 RX ADMIN — CEFTRIAXONE SODIUM 2 G: 2 INJECTION, SOLUTION INTRAVENOUS at 22:30

## 2024-12-27 ASSESSMENT — ACTIVITIES OF DAILY LIVING (ADL)
ADLS_ACUITY_SCORE: 41
ADLS_ACUITY_SCORE: 41

## 2024-12-27 ASSESSMENT — COLUMBIA-SUICIDE SEVERITY RATING SCALE - C-SSRS
2. HAVE YOU ACTUALLY HAD ANY THOUGHTS OF KILLING YOURSELF IN THE PAST MONTH?: NO
6. HAVE YOU EVER DONE ANYTHING, STARTED TO DO ANYTHING, OR PREPARED TO DO ANYTHING TO END YOUR LIFE?: NO
1. IN THE PAST MONTH, HAVE YOU WISHED YOU WERE DEAD OR WISHED YOU COULD GO TO SLEEP AND NOT WAKE UP?: NO

## 2024-12-28 PROBLEM — I85.00 ESOPHAGEAL VARICES WITHOUT BLEEDING (H): Status: ACTIVE | Noted: 2022-01-21

## 2024-12-28 PROBLEM — K74.60 DECOMPENSATED LIVER DISEASE (H): Status: ACTIVE | Noted: 2024-12-28

## 2024-12-28 PROBLEM — E88.01 ALPHA-1-ANTITRYPSIN DEFICIENCY (H): Status: ACTIVE | Noted: 2020-08-12

## 2024-12-28 PROBLEM — R73.9 HYPERGLYCEMIA: Status: ACTIVE | Noted: 2024-12-28

## 2024-12-28 PROBLEM — K76.82 HEPATIC ENCEPHALOPATHY (H): Status: ACTIVE | Noted: 2024-08-26

## 2024-12-28 PROBLEM — R82.5 ELEVATED URINE LEVELS OF CATECHOLAMINES: Status: ACTIVE | Noted: 2021-06-18

## 2024-12-28 PROBLEM — K74.69 DECOMPENSATED LIVER DISEASE (H): Status: ACTIVE | Noted: 2024-12-28

## 2024-12-28 PROBLEM — L03.115 CELLULITIS OF RIGHT LOWER EXTREMITY: Status: ACTIVE | Noted: 2024-12-28

## 2024-12-28 PROBLEM — K72.90 DECOMPENSATED LIVER DISEASE (H): Status: ACTIVE | Noted: 2024-12-28

## 2024-12-28 PROBLEM — L03.90 SEPSIS DUE TO CELLULITIS (H): Status: ACTIVE | Noted: 2024-12-28

## 2024-12-28 PROBLEM — T82.591D: Status: ACTIVE | Noted: 2024-08-26

## 2024-12-28 PROBLEM — G93.41 SEPTIC ENCEPHALOPATHY: Status: ACTIVE | Noted: 2024-12-28

## 2024-12-28 PROBLEM — A41.9 SEPSIS DUE TO CELLULITIS (H): Status: ACTIVE | Noted: 2024-12-28

## 2024-12-28 PROBLEM — E87.1 HYPONATREMIA: Status: ACTIVE | Noted: 2024-12-28

## 2024-12-28 LAB
ALBUMIN SERPL BCG-MCNC: 2.3 G/DL (ref 3.5–5.2)
ALP SERPL-CCNC: 152 U/L (ref 40–150)
ALT SERPL W P-5'-P-CCNC: 38 U/L (ref 0–70)
ANION GAP SERPL CALCULATED.3IONS-SCNC: 9 MMOL/L (ref 7–15)
AST SERPL W P-5'-P-CCNC: 27 U/L (ref 0–45)
BILIRUB SERPL-MCNC: 4 MG/DL
BUN SERPL-MCNC: 17.6 MG/DL (ref 6–20)
CALCIUM SERPL-MCNC: 8.5 MG/DL (ref 8.8–10.4)
CHLORIDE SERPL-SCNC: 103 MMOL/L (ref 98–107)
CREAT SERPL-MCNC: 0.75 MG/DL (ref 0.67–1.17)
CRP SERPL-MCNC: 76.77 MG/L
EGFRCR SERPLBLD CKD-EPI 2021: >90 ML/MIN/1.73M2
ERYTHROCYTE [DISTWIDTH] IN BLOOD BY AUTOMATED COUNT: 17.5 % (ref 10–15)
EST. AVERAGE GLUCOSE BLD GHB EST-MCNC: 192 MG/DL
GLUCOSE BLDC GLUCOMTR-MCNC: 247 MG/DL (ref 70–99)
GLUCOSE BLDC GLUCOMTR-MCNC: 248 MG/DL (ref 70–99)
GLUCOSE BLDC GLUCOMTR-MCNC: 260 MG/DL (ref 70–99)
GLUCOSE SERPL-MCNC: 313 MG/DL (ref 70–99)
GLUCOSE SERPL-MCNC: 325 MG/DL (ref 70–99)
HBA1C MFR BLD: 8.3 %
HCO3 SERPL-SCNC: 20 MMOL/L (ref 22–29)
HCT VFR BLD AUTO: 31.7 % (ref 40–53)
HGB BLD-MCNC: 11 G/DL (ref 13.3–17.7)
HOLD SPECIMEN: NORMAL
HOLD SPECIMEN: NORMAL
LACTATE SERPL-SCNC: 1.9 MMOL/L (ref 0.7–2)
LACTATE SERPL-SCNC: 2.4 MMOL/L (ref 0.7–2)
LACTATE SERPL-SCNC: 3 MMOL/L (ref 0.7–2)
MAGNESIUM SERPL-MCNC: 1.5 MG/DL (ref 1.7–2.3)
MAGNESIUM SERPL-MCNC: 2.1 MG/DL (ref 1.7–2.3)
MCH RBC QN AUTO: 31 PG (ref 26.5–33)
MCHC RBC AUTO-ENTMCNC: 34.7 G/DL (ref 31.5–36.5)
MCV RBC AUTO: 89 FL (ref 78–100)
PLATELET # BLD AUTO: 55 10E3/UL (ref 150–450)
POTASSIUM SERPL-SCNC: 3.6 MMOL/L (ref 3.4–5.3)
POTASSIUM SERPL-SCNC: 3.7 MMOL/L (ref 3.4–5.3)
PROT SERPL-MCNC: 4.8 G/DL (ref 6.4–8.3)
RBC # BLD AUTO: 3.55 10E6/UL (ref 4.4–5.9)
SODIUM SERPL-SCNC: 132 MMOL/L (ref 135–145)
WBC # BLD AUTO: 5.6 10E3/UL (ref 4–11)

## 2024-12-28 PROCEDURE — 84132 ASSAY OF SERUM POTASSIUM: CPT | Performed by: INTERNAL MEDICINE

## 2024-12-28 PROCEDURE — 96366 THER/PROPH/DIAG IV INF ADDON: CPT | Performed by: INTERNAL MEDICINE

## 2024-12-28 PROCEDURE — 83605 ASSAY OF LACTIC ACID: CPT | Performed by: INTERNAL MEDICINE

## 2024-12-28 PROCEDURE — 84295 ASSAY OF SERUM SODIUM: CPT | Performed by: NURSE PRACTITIONER

## 2024-12-28 PROCEDURE — 36415 COLL VENOUS BLD VENIPUNCTURE: CPT | Performed by: INTERNAL MEDICINE

## 2024-12-28 PROCEDURE — 82947 ASSAY GLUCOSE BLOOD QUANT: CPT | Performed by: NURSE PRACTITIONER

## 2024-12-28 PROCEDURE — 80051 ELECTROLYTE PANEL: CPT | Performed by: NURSE PRACTITIONER

## 2024-12-28 PROCEDURE — 36415 COLL VENOUS BLD VENIPUNCTURE: CPT | Performed by: NURSE PRACTITIONER

## 2024-12-28 PROCEDURE — 250N000013 HC RX MED GY IP 250 OP 250 PS 637: Performed by: NURSE PRACTITIONER

## 2024-12-28 PROCEDURE — 250N000012 HC RX MED GY IP 250 OP 636 PS 637: Performed by: INTERNAL MEDICINE

## 2024-12-28 PROCEDURE — 250N000011 HC RX IP 250 OP 636: Performed by: NURSE PRACTITIONER

## 2024-12-28 PROCEDURE — 99222 1ST HOSP IP/OBS MODERATE 55: CPT | Mod: AI | Performed by: INTERNAL MEDICINE

## 2024-12-28 PROCEDURE — 83036 HEMOGLOBIN GLYCOSYLATED A1C: CPT | Performed by: NURSE PRACTITIONER

## 2024-12-28 PROCEDURE — 250N000011 HC RX IP 250 OP 636: Performed by: INTERNAL MEDICINE

## 2024-12-28 PROCEDURE — 250N000013 HC RX MED GY IP 250 OP 250 PS 637: Performed by: INTERNAL MEDICINE

## 2024-12-28 PROCEDURE — 83735 ASSAY OF MAGNESIUM: CPT | Performed by: NURSE PRACTITIONER

## 2024-12-28 PROCEDURE — 86140 C-REACTIVE PROTEIN: CPT | Performed by: NURSE PRACTITIONER

## 2024-12-28 PROCEDURE — 82947 ASSAY GLUCOSE BLOOD QUANT: CPT | Performed by: INTERNAL MEDICINE

## 2024-12-28 PROCEDURE — 120N000001 HC R&B MED SURG/OB

## 2024-12-28 PROCEDURE — 99232 SBSQ HOSP IP/OBS MODERATE 35: CPT | Performed by: NURSE PRACTITIONER

## 2024-12-28 PROCEDURE — 83735 ASSAY OF MAGNESIUM: CPT | Performed by: INTERNAL MEDICINE

## 2024-12-28 PROCEDURE — 85018 HEMOGLOBIN: CPT | Performed by: NURSE PRACTITIONER

## 2024-12-28 RX ORDER — ONDANSETRON 4 MG/1
4 TABLET, ORALLY DISINTEGRATING ORAL EVERY 6 HOURS PRN
Status: DISCONTINUED | OUTPATIENT
Start: 2024-12-28 | End: 2024-12-31 | Stop reason: HOSPADM

## 2024-12-28 RX ORDER — ACETAMINOPHEN 325 MG/1
650 TABLET ORAL EVERY 4 HOURS PRN
Status: DISCONTINUED | OUTPATIENT
Start: 2024-12-28 | End: 2024-12-31 | Stop reason: HOSPADM

## 2024-12-28 RX ORDER — AMOXICILLIN 250 MG
1 CAPSULE ORAL 2 TIMES DAILY PRN
Status: DISCONTINUED | OUTPATIENT
Start: 2024-12-28 | End: 2024-12-31 | Stop reason: HOSPADM

## 2024-12-28 RX ORDER — LACTULOSE 10 G/15ML
20 SOLUTION ORAL DAILY
Status: DISCONTINUED | OUTPATIENT
Start: 2024-12-28 | End: 2024-12-31 | Stop reason: HOSPADM

## 2024-12-28 RX ORDER — VANCOMYCIN HYDROCHLORIDE
1500 EVERY 12 HOURS
Status: DISCONTINUED | OUTPATIENT
Start: 2024-12-28 | End: 2024-12-31 | Stop reason: HOSPADM

## 2024-12-28 RX ORDER — SPIRONOLACTONE 100 MG/1
200 TABLET, FILM COATED ORAL DAILY
Status: DISCONTINUED | OUTPATIENT
Start: 2024-12-28 | End: 2024-12-31 | Stop reason: HOSPADM

## 2024-12-28 RX ORDER — CARVEDILOL 3.12 MG/1
3.12 TABLET ORAL 2 TIMES DAILY WITH MEALS
Status: DISCONTINUED | OUTPATIENT
Start: 2024-12-28 | End: 2024-12-31 | Stop reason: HOSPADM

## 2024-12-28 RX ORDER — AMOXICILLIN 250 MG
2 CAPSULE ORAL 2 TIMES DAILY PRN
Status: DISCONTINUED | OUTPATIENT
Start: 2024-12-28 | End: 2024-12-31 | Stop reason: HOSPADM

## 2024-12-28 RX ORDER — FUROSEMIDE 40 MG/1
80 TABLET ORAL DAILY
Status: DISCONTINUED | OUTPATIENT
Start: 2024-12-28 | End: 2024-12-31 | Stop reason: HOSPADM

## 2024-12-28 RX ORDER — ONDANSETRON 2 MG/ML
4 INJECTION INTRAMUSCULAR; INTRAVENOUS EVERY 6 HOURS PRN
Status: DISCONTINUED | OUTPATIENT
Start: 2024-12-28 | End: 2024-12-31 | Stop reason: HOSPADM

## 2024-12-28 RX ORDER — DEXTROSE MONOHYDRATE 25 G/50ML
25-50 INJECTION, SOLUTION INTRAVENOUS
Status: DISCONTINUED | OUTPATIENT
Start: 2024-12-28 | End: 2024-12-31 | Stop reason: HOSPADM

## 2024-12-28 RX ORDER — ACETAMINOPHEN 650 MG/1
650 SUPPOSITORY RECTAL EVERY 4 HOURS PRN
Status: DISCONTINUED | OUTPATIENT
Start: 2024-12-28 | End: 2024-12-31 | Stop reason: HOSPADM

## 2024-12-28 RX ORDER — NICOTINE POLACRILEX 4 MG
15-30 LOZENGE BUCCAL
Status: DISCONTINUED | OUTPATIENT
Start: 2024-12-28 | End: 2024-12-31 | Stop reason: HOSPADM

## 2024-12-28 RX ORDER — PANTOPRAZOLE SODIUM 40 MG/1
40 TABLET, DELAYED RELEASE ORAL
Status: DISCONTINUED | OUTPATIENT
Start: 2024-12-28 | End: 2024-12-31 | Stop reason: HOSPADM

## 2024-12-28 RX ORDER — LIDOCAINE 40 MG/G
CREAM TOPICAL
Status: DISCONTINUED | OUTPATIENT
Start: 2024-12-28 | End: 2024-12-31

## 2024-12-28 RX ORDER — METFORMIN HYDROCHLORIDE 500 MG/1
1000 TABLET, EXTENDED RELEASE ORAL 2 TIMES DAILY
Status: DISCONTINUED | OUTPATIENT
Start: 2024-12-28 | End: 2024-12-31 | Stop reason: HOSPADM

## 2024-12-28 RX ORDER — CEFTRIAXONE SODIUM 1 G/50ML
1 INJECTION, SOLUTION INTRAVENOUS EVERY 24 HOURS
Status: DISCONTINUED | OUTPATIENT
Start: 2024-12-28 | End: 2024-12-28

## 2024-12-28 RX ORDER — MAGNESIUM SULFATE HEPTAHYDRATE 40 MG/ML
4 INJECTION, SOLUTION INTRAVENOUS ONCE
Status: COMPLETED | OUTPATIENT
Start: 2024-12-28 | End: 2024-12-28

## 2024-12-28 RX ORDER — CALCIUM CARBONATE 500 MG/1
1000 TABLET, CHEWABLE ORAL 4 TIMES DAILY PRN
Status: DISCONTINUED | OUTPATIENT
Start: 2024-12-28 | End: 2024-12-31 | Stop reason: HOSPADM

## 2024-12-28 RX ORDER — LIDOCAINE 40 MG/G
CREAM TOPICAL
Status: DISCONTINUED | OUTPATIENT
Start: 2024-12-28 | End: 2024-12-31 | Stop reason: HOSPADM

## 2024-12-28 RX ORDER — HYDROXYZINE HYDROCHLORIDE 25 MG/1
25 TABLET, FILM COATED ORAL 3 TIMES DAILY PRN
Status: DISCONTINUED | OUTPATIENT
Start: 2024-12-28 | End: 2024-12-31 | Stop reason: HOSPADM

## 2024-12-28 RX ORDER — CEFTRIAXONE SODIUM 2 G/50ML
2 INJECTION, SOLUTION INTRAVENOUS EVERY 24 HOURS
Status: DISCONTINUED | OUTPATIENT
Start: 2024-12-28 | End: 2024-12-31 | Stop reason: HOSPADM

## 2024-12-28 RX ADMIN — RIFAXIMIN 550 MG: 550 TABLET ORAL at 08:49

## 2024-12-28 RX ADMIN — ACETAMINOPHEN 650 MG: 325 TABLET, FILM COATED ORAL at 20:53

## 2024-12-28 RX ADMIN — SPIRONOLACTONE 200 MG: 100 TABLET ORAL at 09:06

## 2024-12-28 RX ADMIN — PANTOPRAZOLE SODIUM 40 MG: 40 TABLET, DELAYED RELEASE ORAL at 08:48

## 2024-12-28 RX ADMIN — RIFAXIMIN 550 MG: 550 TABLET ORAL at 20:48

## 2024-12-28 RX ADMIN — FUROSEMIDE 80 MG: 40 TABLET ORAL at 10:59

## 2024-12-28 RX ADMIN — INSULIN ASPART 3 UNITS: 100 INJECTION, SOLUTION INTRAVENOUS; SUBCUTANEOUS at 18:12

## 2024-12-28 RX ADMIN — INSULIN ASPART 4 UNITS: 100 INJECTION, SOLUTION INTRAVENOUS; SUBCUTANEOUS at 06:07

## 2024-12-28 RX ADMIN — METFORMIN ER 500 MG 1000 MG: 500 TABLET ORAL at 20:49

## 2024-12-28 RX ADMIN — LEVOTHYROXINE SODIUM 250 MCG: 0.1 TABLET ORAL at 08:48

## 2024-12-28 RX ADMIN — CARVEDILOL 3.12 MG: 3.12 TABLET, FILM COATED ORAL at 16:14

## 2024-12-28 RX ADMIN — PANTOPRAZOLE SODIUM 40 MG: 40 TABLET, DELAYED RELEASE ORAL at 16:10

## 2024-12-28 RX ADMIN — METFORMIN ER 500 MG 1000 MG: 500 TABLET ORAL at 08:49

## 2024-12-28 RX ADMIN — INSULIN ASPART 3 UNITS: 100 INJECTION, SOLUTION INTRAVENOUS; SUBCUTANEOUS at 12:52

## 2024-12-28 RX ADMIN — ACETAMINOPHEN 650 MG: 325 TABLET, FILM COATED ORAL at 16:13

## 2024-12-28 RX ADMIN — CARVEDILOL 3.12 MG: 3.12 TABLET, FILM COATED ORAL at 08:49

## 2024-12-28 RX ADMIN — CEFTRIAXONE SODIUM 2 G: 2 INJECTION, SOLUTION INTRAVENOUS at 22:00

## 2024-12-28 RX ADMIN — LACTULOSE 20 G: 10 SOLUTION ORAL at 08:50

## 2024-12-28 RX ADMIN — Medication 1500 MG: at 10:41

## 2024-12-28 RX ADMIN — MAGNESIUM SULFATE HEPTAHYDRATE 4 G: 40 INJECTION, SOLUTION INTRAVENOUS at 08:29

## 2024-12-28 RX ADMIN — VANCOMYCIN HYDROCHLORIDE 1000 MG: 1 INJECTION, SOLUTION INTRAVENOUS at 00:36

## 2024-12-28 RX ADMIN — EMPAGLIFLOZIN 25 MG: 25 TABLET, FILM COATED ORAL at 10:50

## 2024-12-28 RX ADMIN — Medication 1500 MG: at 22:37

## 2024-12-28 ASSESSMENT — ACTIVITIES OF DAILY LIVING (ADL)
ADLS_ACUITY_SCORE: 48
ADLS_ACUITY_SCORE: 41
ADLS_ACUITY_SCORE: 22
ADLS_ACUITY_SCORE: 18
ADLS_ACUITY_SCORE: 48
ADLS_ACUITY_SCORE: 18
ADLS_ACUITY_SCORE: 48
ADLS_ACUITY_SCORE: 18
ADLS_ACUITY_SCORE: 41
ADLS_ACUITY_SCORE: 41
ADLS_ACUITY_SCORE: 18
ADLS_ACUITY_SCORE: 45
ADLS_ACUITY_SCORE: 48
ADLS_ACUITY_SCORE: 41
ADLS_ACUITY_SCORE: 18
ADLS_ACUITY_SCORE: 21
ADLS_ACUITY_SCORE: 18

## 2024-12-28 ASSESSMENT — ENCOUNTER SYMPTOMS
LIGHT-HEADEDNESS: 0
SLEEP DISTURBANCE: 0
ANAL BLEEDING: 0
FEVER: 1
NECK PAIN: 0
DYSURIA: 0
FREQUENCY: 0
VOICE CHANGE: 0
CONFUSION: 1
BLOOD IN STOOL: 0
ABDOMINAL DISTENTION: 0
BACK PAIN: 0
CHILLS: 0
PALPITATIONS: 0
WHEEZING: 0
COLOR CHANGE: 0
MYALGIAS: 0
NAUSEA: 0
VOMITING: 0
ALTERED MENTAL STATUS: 1
HEADACHES: 0
WEAKNESS: 0
DIAPHORESIS: 0
ABDOMINAL PAIN: 0
FLANK PAIN: 0
SHORTNESS OF BREATH: 0
DIZZINESS: 0
COUGH: 0
NUMBNESS: 0
CHEST TIGHTNESS: 0

## 2024-12-28 NOTE — ED NOTES
Stat Doc called back   Unable to accept PT to [facility]  Put on wait list for [facility]  After discharges Around 11 am

## 2024-12-28 NOTE — ED TRIAGE NOTES
Patient wheeled in by his wife.   She reports he has had fevers and confusion for the last 24 hours. Hx of encephalopathy. Took (6) 30mL doses of Lactulose in the last 24 hours w/ no change.     Redness to right upper medial thigh, warm to touch.     Triage Assessment (Adult)       Row Name 12/27/24 1933          Triage Assessment    Airway WDL WDL        Respiratory WDL    Respiratory WDL WDL;expansion/retractions;rhythm/pattern     Rhythm/Pattern, Respiratory unlabored;pattern regular;no shortness of breath reported     Expansion/Accessory Muscles/Retractions no use of accessory muscles;no retractions;expansion symmetric        Skin Circulation/Temperature WDL    Skin Circulation/Temperature WDL temperature     Skin Temperature warm        Cognitive/Neuro/Behavioral WDL    Cognitive/Neuro/Behavioral WDL X     Level of Consciousness intermittent confusion     Arousal Level opens eyes spontaneously     Speech spontaneous;garbled

## 2024-12-28 NOTE — PROGRESS NOTES
"Pt referred with malnutrition screen score of 3 - weight loss 24-33#.     44 yom admitted with cellulitis right lower extremity with resolved sepsis.  Hx includes:  Type 2 DM, DUMONT cirrhosis, hypothyroidism.    Ht-72\", Wt-311#.  IBWR is 160-196#.  BMI is 42.   Wt Readings from Last 10 Encounters:   12/28/24 140.9 kg (310 lb 10.1 oz)   07/29/24 144.6 kg (318 lb 12.8 oz)   03/31/23 147.9 kg (326 lb)   10/22/22 (!) 154.9 kg (341 lb 9.6 oz)   01/18/20 (!) 168.3 kg (371 lb)   08/04/18 (!) 164.5 kg (362 lb 9.6 oz)   07/16/18 (!) 163.5 kg (360 lb 6.4 oz)   12/29/17 (!) 163.7 kg (361 lb)   11/21/17 (!) 161.5 kg (356 lb)   10/17/17 (!) 159 kg (350 lb 9.6 oz)      CCHO (60 grams) diet ordered.  One meal recorded at 100%.     Labs/meds reviewed.  Jardiance 25 mg daily, Metformin XR 1000 mg bid, Novolog per correction scale ordered for dx Type 2 DM.  Glucose levels 416-248 since admission.  A1c 8.3%.  Lasix and Spironolactone may cause weight change r/t fluid shifts.      Pt does not meet criteria for malnutrition.  No overt nutritional concerns observed.  Pt would benefit from visit to outpatient diabetes center upon discharge r/t elevated A1c.  Referral from primary provider required.    RD will follow weight/intake during stay.    "

## 2024-12-28 NOTE — PHARMACY-VANCOMYCIN DOSING SERVICE
Pharmacy Vancomycin Initial Note  Date of Service 2024  Patient's  1980  44 year old, male    Indication: Skin and Soft Tissue Infection    Current estimated CrCl = Estimated Creatinine Clearance: 154.4 mL/min (based on SCr of 0.87 mg/dL).    Creatinine for last 3 days  2024:  8:17 PM Creatinine 0.87 mg/dL    Recent Vancomycin Level(s) for last 3 days  No results found for requested labs within last 3 days.      Vancomycin IV Administrations (past 72 hours)                     vancomycin (VANCOCIN) 1,000 mg in 200 mL dextrose intermittent infusion (mg) 1,000 mg New Bag 24 0036    vancomycin (VANCOCIN) 1,500 mg in 0.9% NaCl 250 mL intermittent infusion (mg) 1,500 mg New Bag 24 2259                    Nephrotoxins and other renal medications (From now, onward)      Start     Dose/Rate Route Frequency Ordered Stop    24 1100  vancomycin (VANCOCIN) 1,500 mg in 0.9% NaCl 250 mL intermittent infusion         1,500 mg  166.7 mL/hr over 90 Minutes Intravenous EVERY 12 HOURS 24 0520              Contrast Orders - past 72 hours (72h ago, onward)      None            [employer] Prediction of Planned Initial Vancomycin Regimen  Loading dose: 2500 mg IV given once in the ED on 24 at approximately 2300.  Regimen: 1500 mg IV every 12 hours.  Start time: 12:36 on 2024  Exposure target: AUC24 (range)400-600 mg/L.hr   AUC24,ss: 538 mg/L.hr  Probability of AUC24 > 400: 71 %  Ctrough,ss: 13.6 mg/L  Probability of Ctrough,ss > 20: 33 %  Probability of nephrotoxicity (Lodise MARÍA ): 9 %          Plan:  Start vancomycin  1500 mg IV q12h beginning 12 hours after loading dose.   Vancomycin monitoring method: AUC  Vancomycin therapeutic monitoring goal: 400-600 mg*h/L  Pharmacy will check vancomycin levels as appropriate in 1-3 Days.    Serum creatinine levels will be ordered daily for the first week of therapy and at least twice weekly for subsequent weeks.      Niall Ritter,  RPH

## 2024-12-28 NOTE — H&P
[facility]    History and Physical - Hospitalist Service       Date of Admission:  12/27/2024    Assessment & Plan      Jason Suarez is a 44 year old male admitted on 12/27/2024. He presented to the ED with confusion and fevers. He was admitted for sepsis secondary to cellulitis.     Sepsis secondary to cellulitis  Septic encephalopathy  He presented with confusion and fevers. Initially it was thought that it might be confusion from hepatic encephalopathy but his ammonia level is normal and his wife has given him mlutiple extra doses of lactulose. Given that he is also meeting sepsis criteria, this is most likely septic encephalopathy.   - admit to hospitalist service  - c/w ceftriaxone and vancomycin  - f/up blood cultures  - track borders of cellulitis    Decompensated liver disease  DUMONT Cirrhosis  History of hepatic encephalopathy  He has DUMONT cirrhosis and appears to have some decompensation given his elevated bilirubin.   - trend CMP  - trend infection  - c/w lactulose and rifaximin  - c/w spironolactone  - hold furosemide for now    T2DM  Poorly controlled but he has no anion gap.   - hold metformin  - check HbA1c  - insulin sliding scale    Hypothyroidism  - c/w levothyroxine    CAD  - c/w carvedilol, spironolactone         Diet: Combination Diet Moderate Consistent Carb (60 g CHO per Meal) Diet; Low Saturated Fat Na <2400mg Diet, No Caffeine Diet  DVT Prophylaxis: Enoxaparin (Lovenox) SQ  Marin Catheter: Not present  Lines: None     Code Status: Full Code    Clinically Significant Risk Factors Present on Admission         # Hyponatremia: Lowest Na = 129 mmol/L in last 2 days, will monitor as appropriate       # Hypoalbuminemia: Lowest albumin = 2.9 g/dL at 12/27/2024  8:17 PM, will monitor as appropriate   # Thrombocytopenia: Lowest platelets = 73 in last 2 days, will monitor for bleeding             # Severe Obesity: Estimated body mass index is 42.12 kg/m  as calculated from the following:    " Height as of this encounter: 1.829 m (6' 0.01\").    Weight as of this encounter: 140.9 kg (310 lb 10.1 oz).              Disposition Plan      Expected Discharge Date: 01/03/2025                The patient's care was discussed with the Bedside Nurse and Patient.        Alvaro Lancaster MD  [facility]  Securely message with the Vocera Web Console (learn more here)  Text page via [employer] Paging/Directory      Visit/Communication Style   Virtual (Video) communication was used to evaluate Jason.  Jason consented to the use of video communication: yes  Video START time: 0520, 12/28/2024  Video STOP time: 0530, 12/28/2024   Patient's location: [facility]   Provider's location during the visit: [facility] Tele-medicine site        ______________________________________________________________________    Chief Complaint   Confusion, fevers    History is obtained from the patient, emergency department physician, and patient's spouse    History of Present Illness   Jason Suarez is a 44 year old male who presented to the ED with confusion and fevers. He states that he started to have symptoms of confusion yesterday in the afternoon. He felt some pain in his right thigh along with a rash at the same He has some new erythema to his right medial thigh. His wife thought that the encephalopathy might be hepatic in origin, which he has had before, and tried to give extra doses (6 in total) of lactulose but he did not seem to improve. In the ED he was found to be febrile and was started on ceftriaxone and vancomycin.     Review of Systems    General: positive for fever, chills, sweats, weakness and confusion  Eyes: negative for blurred vision, loss of vision  Ear Nose and Throat: negative for pharyngitis, speech or swallowing difficulties  Respiratory:  negative for sputum production, wheezing, DUNCAN, pleuritic pain, sob or cough  Cardiology:  negative for chest pain, palpitations, orthopnea, PND, edema, " syncope   Gastrointestinal: negative for abdominal pain, nausea, vomiting, diarrhea, constipation, hematemesis, melena or hematochezia  Genitourinary: negative for frequency, urgency, dysuria, hematuria   Neurological: negative for focal weakness, paresthesia  Integumentary: Erythema to right thigh    Past Medical History    I have reviewed this patient's medical history and updated it with pertinent information if needed.   Past Medical History:   Diagnosis Date    Acute pancreatitis without infection or necrosis     H/O gallbladder pancreatitis    Closed fracture of shaft of left humerus     Bilateral arm fractures as a child    Gastro-esophageal reflux disease without esophagitis     No Comments Provided    Malignant neoplasm of thyroid gland (H)     No Comments Provided    Multiple endocrine neoplasia (MEN) type IIA (H)     No Comments Provided    Obesity     No Comments Provided    Other fracture of left lower leg, initial encounter for closed fracture     No Comments Provided    Umbilical hernia without obstruction or gangrene     hernia repair 7/10/14       Past Surgical History   I have reviewed this patient's surgical history and updated it with pertinent information if needed.  Past Surgical History:   Procedure Laterality Date    CLOSED REDUCTION ANKLE      ORIF lt ankle    LAPAROSCOPIC CHOLECYSTECTOMY      7/10/14,Cholecystectomy,Laparoscopic, liver biopsy, [facility] without mesh    OTHER SURGICAL HISTORY      2066,REMOVE,and removal of syndesmotic screw.    THYROIDECTOMY             Social History   I have reviewed this patient's social history and updated it with pertinent information if needed.  Social History     Tobacco Use    Smoking status: Former     Current packs/day: 0.00     Average packs/day: 1 pack/day for 12.0 years (12.0 ttl pk-yrs)     Types: Cigarettes     Start date: 2003     Quit date: 2015     Years since quittin.5    Smokeless tobacco: Never   Vaping Use    Vaping status:  Never Used   Substance Use Topics    Alcohol use: No    Drug use: No     Comment: Drug use: No       Family History   I have reviewed this patient's family history and updated it with pertinent information if needed.  Family History   Problem Relation Age of Onset    Cancer Father         Cancer,Thyroid cancer.       Prior to Admission Medications   Prior to Admission Medications   Prescriptions Last Dose Informant Patient Reported? Taking?   Cholecalciferol (VITAMIN D3) 1000 UNITS CAPS   Yes No   Sig: Take 1,000 Units by mouth daily   Continuous Blood Gluc Sensor (FREESTYLE EARLINE 14 DAY SENSOR) MISC   Yes No   Sig: INJECT 1 DEVICE UNDER THE SKIN EVERY 14 (FOURTEEN) DAYS.   Glucose Blood (BLOOD GLUCOSE TEST STRIPS) STRP   Yes No   Sig: Test two times a day   blood glucose monitoring (ONE TOUCH DELICA) lancets   Yes No   carvedilol (COREG) 3.125 MG tablet   Yes No   Sig: Take 3.125 mg by mouth 2 times daily (with meals)   cholecalciferol 50 MCG (2000 UT) tablet   Yes No   Sig: Take 2,000 Units by mouth daily   empagliflozin (JARDIANCE) 25 MG TABS tablet   Yes No   Sig: Take 25 mg by mouth   furosemide (LASIX) 20 MG tablet   Yes No   Sig: Take 80 mg by mouth daily   hydrOXYzine (VISTARIL) 25 MG capsule   Yes No   Sig: Take 1 capsule by mouth as needed for itching   lactulose (CHRONULAC) 10 GM/15ML solution   Yes No   Sig: TAKE 30 ML (20 G TOTAL) BY MOUTH DAILY AS NEEDED (TO ACHIEVE 3 BOWEL MOVEMENTS EACH DAY).   levothyroxine (SYNTHROID/LEVOTHROID) 125 MCG tablet   Yes No   Sig: Take 2 tablets by mouth   metFORMIN (GLUCOPHAGE XR) 500 MG 24 hr tablet   Yes No   Sig: Take 1,000 mg by mouth 2 times daily   omeprazole (PRILOSEC) 40 MG DR capsule   Yes No   Sig: Take 1 capsule by mouth daily   rifaximin (XIFAXAN) 550 MG TABS tablet   Yes No   Sig: Take 550 mg by mouth 2 times daily   spironolactone (ALDACTONE) 100 MG tablet   Yes No   Sig: TAKE 3 TABLETS BY MOUTH ONE TIME A DAY.   spironolactone (ALDACTONE) 50 MG tablet    Yes No   Sig: Take 50 mg by mouth   vitamin E 400 UNIT capsule   Yes No   Sig: Take 800 Units by mouth daily   zinc 50 MG TABS   Yes No   Sig: Take 1 tablet by mouth daily      Facility-Administered Medications: None     Allergies   Allergies   Allergen Reactions    Clindamycin Diarrhea     C. Diff following in 2018       Physical Exam   Vital Signs: Temp: 99.5  F (37.5  C) Temp src: Tympanic BP: 130/50 Pulse: 95   Resp: 20 SpO2: 96 % O2 Device: None (Room air)    Weight: 310 lbs 10.05 oz    Gen:  Well-developed, well-nourished, in no acute distress, lying semi-supine in hospital stretcher.  HEENT:  Anicteric sclera, PER, hearing intact to voice  Resp:  No accessory muscle use, breath sounds clear; no wheezes no rales no rhonchi  Card:  Regular rhythm and rate, no murmur, normal S1, S2, no JVD, no LE edema  Abd:  Soft per RN exam, no TTP, non-distended, normoactive bowel sounds are present  Musc:  Normal strength and movement of the major muscle groups without obvious deformity  Skin: Rash involving the right thigh extending to his going. No scrotal involvement. No wounds noted, no drainage.   Psych:  Good insight, oriented to person, place but not time, not anxious, not agitated, + drowsiness  Neuro: CN 2-12 intact, no focal deficits or sensory deficits    Data     Recent Labs   Lab 12/27/24 2017   WBC 9.7   HGB 12.9*   MCV 89   PLT 73*   *   POTASSIUM 3.5   CHLORIDE 98   CO2 20*   BUN 16.3   CR 0.87   ANIONGAP 11   PEREZ 9.6   *   ALBUMIN 2.9*   PROTTOTAL 5.7*   BILITOTAL 6.1*   ALKPHOS 245*   ALT 48   AST 37         Recent Results (from the past 24 hours)   XR Chest Port 1 View    Narrative    EXAM: XR CHEST PORT 1 VIEW  LOCATION: [facility]  DATE: 12/27/2024    INDICATION: Fever.  COMPARISON: None.      Impression    IMPRESSION: Cardiomediastinal silhouette within normal limits. No focal consolidation or pleural effusion.

## 2024-12-28 NOTE — PLAN OF CARE
"  Reason for hospital stay:  Cellulitis of RLE  Living situation PTA: Home w/ wife    Goal outcome evaluation:     Plan of Care Reviewed With:  patient      Overall Patient Progress:  not progressing    Significant events in the last 24 hours: increased confusion and redness to right thigh. Admission to floor at 5 am.      Neuro:  Disoriented to time and situation  CV:  HRR regular, BP stable.  Respiratory:  Lungs clear in upper lobes w/ fine crackles in bases, equal bilat. O2 96% on RA.  GI:  Normoactive BS x 4  :  Voiding spontaneously w/o difficulty  Skin:  Some scattered bruising present, Jaundiced in color, red/ warm area to right thigh/ borders marked.  Lines/Drains:  SL  Nutrition: CC  Mobility:A x 2 w/ gait belt.    Safety:  Bed in lowest position, wheels locked and alarms in place. Call light  and personal items within reach. Room near nurses station w/ frequent rounding and door open    Pain Management:  Denies pain      Most recent vitals: /50 (BP Location: Left arm, Patient Position: Right side, Cuff Size: Adult Regular)   Pulse 95   Temp 99.5  F (37.5  C) (Tympanic)   Resp 20   Ht 1.829 m (6' 0.01\")   Wt 140.9 kg (310 lb 10.1 oz)   SpO2 96%   BMI 42.12 kg/m      Face to face report given with opportunity to observe patient.    Report given to ROULA Fine RN   12/28/2024  7:40 AM              "

## 2024-12-28 NOTE — PLAN OF CARE
Attempted to call spouse to do Med Rec and go through adult profile, but was unable to get a hold of her.

## 2024-12-28 NOTE — PHARMACY
[facility]    Pharmacy      Antimicrobial Stewardship Note     Current antimicrobial therapy:  Anti-infectives (From now, onward)      Start     Dose/Rate Route Frequency Ordered Stop    12/28/24 2200  cefTRIAXone IN D5W (ROCEPHIN) intermittent infusion 2 g         2 g  100 mL/hr over 30 Minutes Intravenous EVERY 24 HOURS 12/28/24 0840      12/28/24 1100  vancomycin (VANCOCIN) 1,500 mg in 0.9% NaCl 250 mL intermittent infusion         1,500 mg  166.7 mL/hr over 90 Minutes Intravenous EVERY 12 HOURS 12/28/24 0520      12/28/24 0900  rifaximin (XIFAXAN) tablet 550 mg        Note to Pharmacy: [facility] [place] Sig:Take 550 mg by mouth 2 times daily      550 mg Oral 2 TIMES DAILY 12/28/24 0829              Indication: SSTI    Days of Therapy: 2     Pertinent labs:  Creatinine   Creatinine   Date Value Ref Range Status   12/28/2024 0.75 0.67 - 1.17 mg/dL Final   12/27/2024 0.87 0.67 - 1.17 mg/dL Final   12/12/2023 0.71 0.67 - 1.17 mg/dL Final   05/21/2020 0.83 0.70 - 1.20 mg/dL Final   03/12/2020 0.94 0.70 - 1.20 mg/dL Final   05/07/2018 0.80 0.70 - 1.20 mg/dL Final     WBC   WBC   Date Value Ref Range Status   01/11/2018 7.7 4.0 - 11.0 10e9/L Final     WBC Count   Date Value Ref Range Status   12/28/2024 5.6 4.0 - 11.0 10e3/uL Final   12/27/2024 9.7 4.0 - 11.0 10e3/uL Final   12/12/2023 8.4 4.0 - 11.0 10e3/uL Final     Procalcitonin   Procalcitonin   Date Value Ref Range Status   12/27/2024 3.91 (H) <0.50 ng/mL Final     Comment:     Interpretation and Recommendations     <0.5 ng/mL:   Systemic bacterial infection unlikely. Local bacterial infection is possible.     0.5-1.99 ng/mL:   Systemic bacterial infection possible, but various other conditions are known to induce PCT as well.     >=2.00 ng/mL:   Systemic bacterial infection likely, unless other causes are known.     Decision to start antibiotics should not be based on procalcitonin level alone. See Procalcitonin Guidance document for more details.  "[email or web address]    Factors that may affect PCT levels (not all-inclusive):     - Increased PCT level           Severe trauma/burns           Invasive surgery           Cooling therapy after cardiac arrest/surgery           Treatment with agents which stimulate cytokines           Acute kidney injury           Chronic kidney disease and end stage renal disease           Acute graft vs host disease           Non-specific shock causing decreased organ perfusion and/or infarction       - Normal or unchanged PCT level           Early in infections (if low and infection is suspected, repeating in 6-12 hours is recommended)           Chronic infections (endocarditis, osteomyelitis, prosthetic device/graft infections)           Localized infections (cellulitis, wound infections, intra-abdominal abscess)     Note: PCT has not been extensively studied in pregnancy/breastfeeding, pediatrics, severe immunosuppression, and cystic fibrosis.     CRP No results found for: \"CRP\"    Culture Results:      Recommendations/Interventions:  1. None    Craig Mario, [facility]  December 28, 2024        "

## 2024-12-28 NOTE — PLAN OF CARE
[facility] Inpatient Admission Note:    Patient admitted to 3114/3114-1 at approximately 0458 via cart accompanied by transport tech from emergency room . Report received from Al BARCENAS RN in SBAR format at 0438 via telephone. Patient ambulated to bed via self.. Patient is alert and oriented X 1, denies pain; rates at 0 on 0-10 scale.  Patient oriented to room, unit, hourly rounding, and plan of care. Explained admission packet and patient handbook with patient bill of rights brochure. Will continue to monitor and document as needed.     Inpatient Nursing criteria listed below was met:    Health care directives status obtained and documented: Yes    Patient identifies a surrogate decision maker: Yes If yes, who:Kiaaishwarya Suarez, wife.  Contact Information:See face sheet     If initial lactic acid greater than 2.0, repeat lactic acid drawn within one hour of arrival to unit: Yes.   Clergy visit ordered if patient requests: N/A    Skin issues/needs documented: Yes    Isolation Patient: no     Fall Prevention Yes: Care plan updated, education given and documented, sticker and magnet in place: Yes    Care Plan initiated: Yes    Education Documented (including assessment): Yes, but need reinforcement    Patient has discharge needs : Yes If yes, please explain:Assistance w/ cares due to confusion.

## 2024-12-28 NOTE — PROGRESS NOTES
"[facility]    Hospitalist Progress Note    Date of Service (when I saw the patient): 12/28/2024    Assessment & Plan       Cellulitis of right lower extremity:Recurrent cellulitis-in the left leg usually.  Erythema and pain starting to improve. Continue Vanco and Rocephin-will plan for at least 48 hours of IV antibiotics depending on course.       Sepsis due to cellulitis (H): resolved. Lactic acid is normal, afebrile, mentation normal.       Septic encephalopathy: Resolved      Decompensated liver disease (H): Stable-LFTs improved from last night. Restart his home meds. Pressures have been stable this morning as well so will give his daily lasix dose as well      Hyperglycemia: Due to sepsis plus inadequately controlled at baseline since stopping jardiance. Did discuss with him yeast in the groin is unlikely due to the Jardiance unless he is chronically incontinent. He is willing to give it another try so did restart it.       Hyponatremia: Improved overnight, recheck in AM      Type 2 diabetes mellitus without complication, without long-term current use of insulin (H): As above, Also continue metformin and add sliding scale.        # Hyponatremia: Lowest Na = 129 mmol/L in last 2 days, will monitor as appropriate    # Hypercalcemia: corrected calcium is >10.1, will monitor as appropriate  # Hypomagnesemia: Lowest Mg = 1.5 mg/dL in last 2 days, will replace as needed   # Hypoalbuminemia: Lowest albumin = 2.3 g/dL at 12/28/2024  7:23 AM, will monitor as appropriate   # Thrombocytopenia: Lowest platelets = 55 in last 2 days, will monitor for bleeding  # DMII: A1C = 8.3 % (Ref range: <5.7 %) within past 6 months    # Severe Obesity: Estimated body mass index is 42.12 kg/m  as calculated from the following:    Height as of this encounter: 1.829 m (6' 0.01\").    Weight as of this encounter: 140.9 kg (310 lb 10.1 oz).           DVT Prophylaxis: Low Risk/Ambulatory with no VTE prophylaxis indicated  Code " Status: Full Code    Disposition: Expected discharge in 2 days once infection markers stable.    Lanie Palmer, CNP    Interval History   Right leg painful but improved, denies chest pain, dyspnea, abdominal pain or nausea    -Data reviewed today: I reviewed all new labs and imaging results over the last 24 hours.     Physical Exam   Temp: 98.8  F (37.1  C) Temp src: Tympanic BP: 123/62 Pulse: 85   Resp: 16 SpO2: 95 % O2 Device: None (Room air)    Vitals:    12/28/24 0458   Weight: 140.9 kg (310 lb 10.1 oz)     Vital Signs with Ranges  Temp:  [98.8  F (37.1  C)-102.4  F (39.1  C)] 98.8  F (37.1  C)  Pulse:  [] 85  Resp:  [16-20] 16  BP: ()/(28-98) 123/62  SpO2:  [94 %-97 %] 95 %  I/O last 3 completed shifts:  In: -   Out: 325 [Urine:325]    Peripheral IV 12/27/24 Anterior;Left Upper forearm (Active)   Site Assessment WDL 12/28/24 0458   Line Status Saline locked 12/28/24 0458   Dressing Transparent 12/28/24 0458   Dressing Status clean;dry;intact 12/28/24 0458   Line Intervention Flushed 12/28/24 0458   Phlebitis Scale 0-->no symptoms 12/28/24 0458   Infiltration? no 12/28/24 0458   Number of days: 1     Line/device assessment(s) completed for medical necessity    Constitutional: Awake,alert, no acute distress  Respiratory: Clear bilaterally, no crackles, wheezes or rhonchi  Cardiovascular: HRR, no murmurs, rubs,thrills.   GI: Soft,nontender, bowel sounds hyperactive   Skin/Integumen: Irregular patchy redness noted to the right thigh above the knee, circumferential areas are noted. Extends up into the midline thigh.   Other:      Medications   Current Facility-Administered Medications   Medication Dose Route Frequency Provider Last Rate Last Admin     Current Facility-Administered Medications   Medication Dose Route Frequency Provider Last Rate Last Admin    carvedilol (COREG) tablet 3.125 mg  3.125 mg Oral BID w/meals Lanie Palmer, NP   3.125 mg at 12/28/24 0849    cefTRIAXone IN D5W  (ROCEPHIN) intermittent infusion 2 g  2 g Intravenous Q24H Lanie Palmer R, NP        empagliflozin (JARDIANCE) tablet 25 mg  25 mg Oral Daily Lanie Palmer R, NP   25 mg at 12/28/24 1050    furosemide (LASIX) tablet 80 mg  80 mg Oral Daily Lanie Palmer R, NP   80 mg at 12/28/24 1059    insulin aspart (NovoLOG) injection (RAPID ACTING)  1-7 Units Subcutaneous TID AC Alvaro Lancaster MD   4 Units at 12/28/24 0607    insulin aspart (NovoLOG) injection (RAPID ACTING)  1-5 Units Subcutaneous At Bedtime Alvaro Lancaster MD        lactulose (CHRONULAC) solution 20 g  20 g Oral Daily Lanie Palmer NP   20 g at 12/28/24 0850    levothyroxine (SYNTHROID/LEVOTHROID) tablet 250 mcg  250 mcg Oral QAM AC Lanie Palmer NP   250 mcg at 12/28/24 0848    metFORMIN (GLUCOPHAGE XR) 24 hr tablet 1,000 mg  1,000 mg Oral BID Lanie Palmer NP   1,000 mg at 12/28/24 0849    pantoprazole (PROTONIX) EC tablet 40 mg  40 mg Oral BID AC Lanie Palmer NP   40 mg at 12/28/24 0848    rifaximin (XIFAXAN) tablet 550 mg  550 mg Oral BID Lanie Palmer NP   550 mg at 12/28/24 0849    sodium chloride (PF) 0.9% PF flush 3 mL  3 mL Intracatheter Q8H Alvaro Lancaster MD        sodium chloride (PF) 0.9% PF flush 3 mL  3 mL Intracatheter Q8H Alvaro Lancaster MD   3 mL at 12/28/24 0609    spironolactone (ALDACTONE) tablet 200 mg  200 mg Oral Daily Lanie Palmer NP   200 mg at 12/28/24 0906    vancomycin (VANCOCIN) 1,500 mg in 0.9% NaCl 250 mL intermittent infusion  1,500 mg Intravenous Q12H Alvaro Lancaster .7 mL/hr at 12/28/24 1041 1,500 mg at 12/28/24 1041       Data   Recent Labs   Lab 12/28/24  0723 12/28/24  0523 12/27/24 2017   WBC  --  5.6 9.7   HGB  --  11.0* 12.9*   MCV  --  89 89   PLT  --  55* 73*   *  --  129*   POTASSIUM 3.7  3.6  --  3.5   CHLORIDE 103  --  98   CO2 20*  --  20*   BUN 17.6  --  16.3   CR 0.75  --  0.87   ANIONGAP 9  --  11   PEREZ 8.5*  --  9.6   * 325* 416*    ALBUMIN 2.3*  --  2.9*   PROTTOTAL 4.8*  --  5.7*   BILITOTAL 4.0*  --  6.1*   ALKPHOS 152*  --  245*   ALT 38  --  48   AST 27  --  37       Recent Results (from the past 24 hours)   XR Chest Port 1 View    Narrative    EXAM: XR CHEST PORT 1 VIEW  LOCATION: [facility]  DATE: 12/27/2024    INDICATION: Fever.  COMPARISON: None.      Impression    IMPRESSION: Cardiomediastinal silhouette within normal limits. No focal consolidation or pleural effusion.

## 2024-12-28 NOTE — ED PROVIDER NOTES
History     Chief Complaint   Patient presents with    Fever    Altered Mental Status     The history is provided by a relative.   Altered Mental Status  Presenting symptoms: confusion    Severity:  Mild  Most recent episode:  Yesterday  Episode history:  Continuous  Progression:  Unchanged  Chronicity:  Recurrent  Associated symptoms: fever and rash    Associated symptoms: no abdominal pain, no headaches, no light-headedness, no nausea, no palpitations, no vomiting and no weakness          Allergies:  Allergies   Allergen Reactions    Clindamycin Diarrhea     C. Diff following in 2018       Problem List:    Patient Active Problem List    Diagnosis Date Noted    Cellulitis of right lower extremity 12/28/2024     Priority: Medium    Septic encephalopathy 12/28/2024     Priority: Medium    Decompensated liver disease (H) 12/28/2024     Priority: Medium    Hyperglycemia 12/28/2024     Priority: Medium    Morbid obesity (H) 10/22/2022     Priority: Medium    Routine general medical examination at a health care facility 07/18/2018     Priority: Medium    Esophageal reflux 01/11/2018     Priority: Medium    Type 2 diabetes mellitus without complication, without long-term current use of insulin (H) 06/29/2017     Priority: Medium    Dermatitis 06/26/2017     Priority: Medium    Screening for lipid disorders 06/26/2017     Priority: Medium    Cirrhosis, non-alcoholic (H) 01/06/2015     Priority: Medium    Micronodular cirrhosis (H) 07/10/2014     Priority: Medium    MEN 2A (multiple endocrine neoplasia, type 2A) (H) 04/07/2014     Priority: Medium    Hypothyroidism 01/16/2013     Priority: Medium    Coronary artery abnormality 05/25/2010     Priority: Medium    Family history of diabetes mellitus 05/25/2010     Priority: Medium    Fatty liver 05/25/2010     Priority: Medium    Polyp of gallbladder 05/25/2010     Priority: Medium    Genetic disorder 05/25/2010     Priority: Medium    Lymphadenopathy 05/25/2010      Priority: Medium    Medullary carcinoma of thyroid (H) 2010     Priority: Medium     Overview:   MEN2 Known Familial Mutation reported positive by [facility] 3-12-10. See scanned report      Family history of malignant neoplasm of thyroid 2010     Priority: Medium    Postoperative hypothyroidism 2010     Priority: Medium    Malignant neoplasm of thyroid gland (H) 2010     Priority: Medium    Obesity 2010     Priority: Medium        Past Medical History:    Past Medical History:   Diagnosis Date    Acute pancreatitis without infection or necrosis     Closed fracture of shaft of left humerus     Gastro-esophageal reflux disease without esophagitis     Malignant neoplasm of thyroid gland (H)     Multiple endocrine neoplasia (MEN) type IIA (H)     Obesity     Other fracture of left lower leg, initial encounter for closed fracture     Umbilical hernia without obstruction or gangrene        Past Surgical History:    Past Surgical History:   Procedure Laterality Date    CLOSED REDUCTION ANKLE      ORIF lt ankle    LAPAROSCOPIC CHOLECYSTECTOMY      7/10/14,Cholecystectomy,Laparoscopic, liver biopsy, [facility] without mesh    OTHER SURGICAL HISTORY      2066,REMOVE,and removal of syndesmotic screw.    THYROIDECTOMY             Family History:    Family History   Problem Relation Age of Onset    Cancer Father         Cancer,Thyroid cancer.       Social History:  Marital Status:   [2]  Social History     Tobacco Use    Smoking status: Former     Current packs/day: 0.00     Average packs/day: 1 pack/day for 12.0 years (12.0 ttl pk-yrs)     Types: Cigarettes     Start date: 2003     Quit date: 2015     Years since quittin.5    Smokeless tobacco: Never   Vaping Use    Vaping status: Never Used   Substance Use Topics    Alcohol use: No    Drug use: No     Comment: Drug use: No        Medications:    blood glucose monitoring (ONE TOUCH DELICA) lancets  carvedilol (COREG) 3.125  MG tablet  Cholecalciferol (VITAMIN D3) 1000 UNITS CAPS  cholecalciferol 50 MCG (2000 UT) tablet  Continuous Blood Gluc Sensor (FREESTYLE EARLINE 14 DAY SENSOR) MISC  empagliflozin (JARDIANCE) 25 MG TABS tablet  furosemide (LASIX) 20 MG tablet  Glucose Blood (BLOOD GLUCOSE TEST STRIPS) STRP  hydrOXYzine (VISTARIL) 25 MG capsule  lactulose (CHRONULAC) 10 GM/15ML solution  levothyroxine (SYNTHROID/LEVOTHROID) 125 MCG tablet  metFORMIN (GLUCOPHAGE XR) 500 MG 24 hr tablet  omeprazole (PRILOSEC) 40 MG DR capsule  rifaximin (XIFAXAN) 550 MG TABS tablet  spironolactone (ALDACTONE) 100 MG tablet  spironolactone (ALDACTONE) 50 MG tablet  vitamin E 400 UNIT capsule  zinc 50 MG TABS          Review of Systems   Constitutional:  Positive for fever. Negative for chills and diaphoresis.   HENT:  Negative for voice change.    Eyes:  Negative for visual disturbance.   Respiratory:  Negative for cough, chest tightness, shortness of breath and wheezing.    Cardiovascular:  Negative for chest pain, palpitations and leg swelling.   Gastrointestinal:  Negative for abdominal distention, abdominal pain, anal bleeding, blood in stool, nausea and vomiting.   Genitourinary:  Negative for decreased urine volume, dysuria, flank pain and frequency.   Musculoskeletal:  Negative for back pain, gait problem, myalgias and neck pain.   Skin:  Positive for rash. Negative for color change and pallor.   Neurological:  Negative for dizziness, syncope, weakness, light-headedness, numbness and headaches.   Psychiatric/Behavioral:  Positive for confusion. Negative for sleep disturbance and suicidal ideas.        Physical Exam   BP: 128/69  Pulse: 118  Temp: (!) 102.4  F (39.1  C)  Resp: 20  SpO2: 96 %      Physical Exam  Vitals and nursing note reviewed.   Constitutional:       Appearance: He is obese.   HENT:      Head: Normocephalic and atraumatic.   Eyes:      Conjunctiva/sclera: Conjunctivae normal.      Pupils: Pupils are equal, round, and reactive to  light.   Neck:      Thyroid: No thyromegaly.      Vascular: No JVD.      Trachea: No tracheal deviation.   Cardiovascular:      Rate and Rhythm: Normal rate and regular rhythm.      Heart sounds: Normal heart sounds. No murmur heard.     No gallop.   Pulmonary:      Effort: Pulmonary effort is normal. No respiratory distress.      Breath sounds: Normal breath sounds. No stridor. No wheezing or rales.   Chest:      Chest wall: No tenderness.   Abdominal:      General: Bowel sounds are normal. There is no distension.      Palpations: Abdomen is soft. There is no mass.      Tenderness: There is no abdominal tenderness. There is no guarding or rebound.   Musculoskeletal:         General: No tenderness. Normal range of motion.      Cervical back: Normal range of motion and neck supple.   Lymphadenopathy:      Cervical: No cervical adenopathy.   Skin:     General: Skin is warm.      Coloration: Skin is not pale.      Findings: Erythema present. No rash.             Comments: Right lateral thigh erythmatous rash, mild tenderness, 12 x 20 cm, no fluctuation    Neurological:      Mental Status: He is confused.         ED Course        Procedures                  Results for orders placed or performed during the hospital encounter of 12/27/24 (from the past 24 hours)   [place] Draw    Narrative    The following orders were created for panel order [place] Draw.  Procedure                               Abnormality         Status                     ---------                               -----------         ------                     Extra Blue Top Tube[[id number]]                              Final result               Extra Red Top Tube[[id number]]                               Final result               Extra Green Top (Lithium...[[id number]]                                                 Extra Purple Top Tube[[id number]]                                                       Extra Green Top (Lithium...[[id number]]                                                    Please view results for these tests on the individual orders.   CBC with platelets differential    Narrative    The following orders were created for panel order CBC with platelets differential.  Procedure                               Abnormality         Status                     ---------                               -----------         ------                     CBC with platelets and d...[[id number]]  Abnormal            Final result               RBC and Platelet Morphology[[id number]]                                                   Please view results for these tests on the individual orders.   Comprehensive metabolic panel   Result Value Ref Range    Sodium 129 (L) 135 - 145 mmol/L    Potassium 3.5 3.4 - 5.3 mmol/L    Carbon Dioxide (CO2) 20 (L) 22 - 29 mmol/L    Anion Gap 11 7 - 15 mmol/L    Urea Nitrogen 16.3 6.0 - 20.0 mg/dL    Creatinine 0.87 0.67 - 1.17 mg/dL    GFR Estimate >90 >60 mL/min/1.73m2    Calcium 9.6 8.8 - 10.4 mg/dL    Chloride 98 98 - 107 mmol/L    Glucose 416 (H) 70 - 99 mg/dL    Alkaline Phosphatase 245 (H) 40 - 150 U/L    AST 37 0 - 45 U/L    ALT 48 0 - 70 U/L    Protein Total 5.7 (L) 6.4 - 8.3 g/dL    Albumin 2.9 (L) 3.5 - 5.2 g/dL    Bilirubin Total 6.1 (H) <=1.2 mg/dL   Extra Blue Top Tube   Result Value Ref Range    Hold Specimen JIC    Extra Red Top Tube   Result Value Ref Range    Hold Specimen JIC    CBC with platelets and differential   Result Value Ref Range    WBC Count 9.7 4.0 - 11.0 10e3/uL    RBC Count 4.21 (L) 4.40 - 5.90 10e6/uL    Hemoglobin 12.9 (L) 13.3 - 17.7 g/dL    Hematocrit 37.3 (L) 40.0 - 53.0 %    MCV 89 78 - 100 fL    MCH 30.6 26.5 - 33.0 pg    MCHC 34.6 31.5 - 36.5 g/dL    RDW 17.6 (H) 10.0 - 15.0 %    Platelet Count 73 (L) 150 - 450 10e3/uL    % Neutrophils 85 %    % Lymphocytes 4 %    % Monocytes 10 %    % Eosinophils 0 %    % Basophils 0 %    % Immature Granulocytes 1 %    NRBCs per 100 WBC 0 <1 /100    Absolute  Neutrophils 8.3 1.6 - 8.3 10e3/uL    Absolute Lymphocytes 0.4 (L) 0.8 - 5.3 10e3/uL    Absolute Monocytes 0.9 0.0 - 1.3 10e3/uL    Absolute Eosinophils 0.0 0.0 - 0.7 10e3/uL    Absolute Basophils 0.0 0.0 - 0.2 10e3/uL    Absolute Immature Granulocytes 0.1 <=0.4 10e3/uL    Absolute NRBCs 0.0 10e3/uL   Extra Tube    Narrative    The following orders were created for panel order Extra Tube.  Procedure                               Abnormality         Status                     ---------                               -----------         ------                     Extra Heparinized Syringe[[id number]]                        Final result                 Please view results for these tests on the individual orders.   Extra Heparinized Syringe   Result Value Ref Range    Hold Specimen JIC    CRP inflammation   Result Value Ref Range    CRP Inflammation 74.30 (H) <5.00 mg/L   Procalcitonin   Result Value Ref Range    Procalcitonin 3.91 (H) <0.50 ng/mL   CK total   Result Value Ref Range    CK 72 39 - 308 U/L   Lactic acid whole blood   Result Value Ref Range    Lactic Acid 4.5 (HH) 0.7 - 2.0 mmol/L   Influenza A/B, RSV and SARS-CoV2 PCR (COVID-19) Nasopharyngeal    Specimen: Nasopharyngeal; Swab   Result Value Ref Range    Influenza A PCR Negative Negative    Influenza B PCR Negative Negative    RSV PCR Negative Negative    SARS CoV2 PCR Negative Negative    Narrative    Testing was performed using the Xpert Xpress CoV2/Flu/RSV Assay on the [employer] GeneXpert Instrument. This test should be ordered for the detection of SARS-CoV2, influenza, and RSV viruses in individuals with signs and symptoms of respiratory tract infection. This test is for in vitro diagnostic use under the [place] FDA for laboratories certified under CLIA to perform high or moderate complexity testing. This test has been [place] FDA cleared. A negative result does not rule out the presence of PCR inhibitors in the specimen or target RNA in concentration below the  limit of detection for the assay. If only one viral target is positive but coinfection with multiple targets is suspected, the sample should be re-tested with another FDA cleared, approved, or authorized test, if coninfection would change clinical management. This test was validated by the [facility] and [facility]. These laboratories are certified under the Clinical Laboratory Improvement Amendments of 1988 (CLIA-88) as qualified to perfom high complexity laboratory testing.   Erythrocyte sedimentation rate auto   Result Value Ref Range    Erythrocyte Sedimentation Rate 13 0 - 15 mm/hr   Ammonia   Result Value Ref Range    Ammonia 33 16 - 60 umol/L   [place] Draw    Narrative    The following orders were created for panel order [place] Draw.  Procedure                               Abnormality         Status                     ---------                               -----------         ------                     Extra Blood Culture Bottle[[id number]]                       Final result                 Please view results for these tests on the individual orders.   Extra Blood Culture Bottle   Result Value Ref Range    Hold Specimen JIC    XR Chest Port 1 View    Narrative    EXAM: XR CHEST PORT 1 VIEW  LOCATION: [facility]  DATE: 12/27/2024    INDICATION: Fever.  COMPARISON: None.      Impression    IMPRESSION: Cardiomediastinal silhouette within normal limits. No focal consolidation or pleural effusion.   UA with Microscopic reflex to Culture    Specimen: Urine, Midstream   Result Value Ref Range    Color Urine Orange (A) Colorless, Straw, Light Yellow, Yellow    Appearance Urine Clear Clear    Glucose Urine >1000 (A) Negative mg/dL    Bilirubin Urine Negative Negative    Ketones Urine Trace (A) Negative mg/dL    Specific Gravity Urine 1.033 1.003 - 1.035    Blood Urine Negative Negative    pH Urine 5.0 4.7 - 8.0    Protein Albumin Urine 20 (A) Negative mg/dL    Urobilinogen Urine 2.0 Normal, 2.0  mg/dL    Nitrite Urine Negative Negative    Leukocyte Esterase Urine Negative Negative    Mucus Urine Present (A) None Seen /LPF    RBC Urine 0 <=2 /HPF    WBC Urine 3 <=5 /HPF    Squamous Epithelials Urine 0 <=1 /HPF    Hyaline Casts Urine 10 (H) <=2 /LPF    Narrative    Urine Culture not indicated   Lactic acid whole blood   Result Value Ref Range    Lactic Acid 3.0 (H) 0.7 - 2.0 mmol/L       Medications   cefTRIAXone IN D5W (ROCEPHIN) intermittent infusion 2 g (0 g Intravenous Stopped 12/27/24 2255)   sodium chloride 0.9% BOLUS 1,000 mL (0 mLs Intravenous Stopped 12/28/24 0036)   vancomycin (VANCOCIN) 1,500 mg in 0.9% NaCl 250 mL intermittent infusion (0 mg Intravenous Stopped 12/28/24 0036)     Followed by   vancomycin (VANCOCIN) 1,000 mg in 200 mL dextrose intermittent infusion (0 mg Intravenous Stopped 12/28/24 0144)       Assessments & Plan (with Medical Decision Making)   AMS in form confusion  Fever and right upper leg erythema        Labs reviewed  Blood cx sent, IVF, IV vanco + IV rocephin given    No bed was available so pt placed on [facility] waiting list , later we had availability for admission , So I spoke to Dr Lancaster, accepted for admission.     I have reviewed the nursing notes.    I have reviewed the findings, diagnosis, plan and need for follow up with the patient.          New Prescriptions    No medications on file       Final diagnoses:   Septic encephalopathy   Cellulitis of right lower extremity       12/27/2024   [facility]       Venkat Austin MD  12/28/24 2755

## 2024-12-28 NOTE — PLAN OF CARE
Goal Outcome Evaluation:      Plan of Care Reviewed With: patient    Overall Patient Progress: improving    Alert and oriented. Complained of headache, PRN tylenol given with relief. Redness to right thigh remained within marked borders. Ambulation with SBA. Vitals and assessments as charted. Uses call light appropriately.     Face to face report given with opportunity to observe patient.    Report given to Teresa Valverde RN   12/28/2024  7:11 PM

## 2024-12-28 NOTE — ED NOTES
Face to face report given with opportunity to observe patient.    Report given to ROULA Melendez RN   12/28/2024  1:35 AM

## 2024-12-29 PROBLEM — R78.81 GRAM-POSITIVE BACTEREMIA: Status: ACTIVE | Noted: 2024-12-29

## 2024-12-29 LAB
ALBUMIN SERPL BCG-MCNC: 2.2 G/DL (ref 3.5–5.2)
ALP SERPL-CCNC: 143 U/L (ref 40–150)
ALT SERPL W P-5'-P-CCNC: 36 U/L (ref 0–70)
ANION GAP SERPL CALCULATED.3IONS-SCNC: 9 MMOL/L (ref 7–15)
AST SERPL W P-5'-P-CCNC: 29 U/L (ref 0–45)
BILIRUB SERPL-MCNC: 3.2 MG/DL
BUN SERPL-MCNC: 17.8 MG/DL (ref 6–20)
CALCIUM SERPL-MCNC: 9.1 MG/DL (ref 8.8–10.4)
CHLORIDE SERPL-SCNC: 102 MMOL/L (ref 98–107)
CREAT SERPL-MCNC: 0.74 MG/DL (ref 0.67–1.17)
EGFRCR SERPLBLD CKD-EPI 2021: >90 ML/MIN/1.73M2
ERYTHROCYTE [DISTWIDTH] IN BLOOD BY AUTOMATED COUNT: 17.1 % (ref 10–15)
GLUCOSE BLDC GLUCOMTR-MCNC: 127 MG/DL (ref 70–99)
GLUCOSE BLDC GLUCOMTR-MCNC: 137 MG/DL (ref 70–99)
GLUCOSE BLDC GLUCOMTR-MCNC: 163 MG/DL (ref 70–99)
GLUCOSE BLDC GLUCOMTR-MCNC: 178 MG/DL (ref 70–99)
GLUCOSE BLDC GLUCOMTR-MCNC: 187 MG/DL (ref 70–99)
GLUCOSE SERPL-MCNC: 134 MG/DL (ref 70–99)
HCO3 SERPL-SCNC: 24 MMOL/L (ref 22–29)
HCT VFR BLD AUTO: 31.1 % (ref 40–53)
HGB BLD-MCNC: 10.8 G/DL (ref 13.3–17.7)
INR PPP: 1.47 (ref 0.85–1.15)
MAGNESIUM SERPL-MCNC: 1.9 MG/DL (ref 1.7–2.3)
MCH RBC QN AUTO: 31.1 PG (ref 26.5–33)
MCHC RBC AUTO-ENTMCNC: 34.7 G/DL (ref 31.5–36.5)
MCV RBC AUTO: 90 FL (ref 78–100)
PLATELET # BLD AUTO: 53 10E3/UL (ref 150–450)
POTASSIUM SERPL-SCNC: 4 MMOL/L (ref 3.4–5.3)
PROT SERPL-MCNC: 5 G/DL (ref 6.4–8.3)
RBC # BLD AUTO: 3.47 10E6/UL (ref 4.4–5.9)
SODIUM SERPL-SCNC: 135 MMOL/L (ref 135–145)
VANCOMYCIN SERPL-MCNC: 26.9 UG/ML
WBC # BLD AUTO: 4 10E3/UL (ref 4–11)

## 2024-12-29 PROCEDURE — 99232 SBSQ HOSP IP/OBS MODERATE 35: CPT | Performed by: NURSE PRACTITIONER

## 2024-12-29 PROCEDURE — 80053 COMPREHEN METABOLIC PANEL: CPT | Performed by: INTERNAL MEDICINE

## 2024-12-29 PROCEDURE — 36415 COLL VENOUS BLD VENIPUNCTURE: CPT | Performed by: INTERNAL MEDICINE

## 2024-12-29 PROCEDURE — 84132 ASSAY OF SERUM POTASSIUM: CPT | Performed by: INTERNAL MEDICINE

## 2024-12-29 PROCEDURE — 250N000013 HC RX MED GY IP 250 OP 250 PS 637: Performed by: NURSE PRACTITIONER

## 2024-12-29 PROCEDURE — 120N000001 HC R&B MED SURG/OB

## 2024-12-29 PROCEDURE — 250N000011 HC RX IP 250 OP 636: Performed by: NURSE PRACTITIONER

## 2024-12-29 PROCEDURE — 80202 ASSAY OF VANCOMYCIN: CPT | Performed by: NURSE PRACTITIONER

## 2024-12-29 PROCEDURE — 84460 ALANINE AMINO (ALT) (SGPT): CPT | Performed by: INTERNAL MEDICINE

## 2024-12-29 PROCEDURE — 36415 COLL VENOUS BLD VENIPUNCTURE: CPT | Performed by: NURSE PRACTITIONER

## 2024-12-29 PROCEDURE — 85027 COMPLETE CBC AUTOMATED: CPT | Performed by: INTERNAL MEDICINE

## 2024-12-29 PROCEDURE — 250N000011 HC RX IP 250 OP 636: Performed by: INTERNAL MEDICINE

## 2024-12-29 PROCEDURE — 84155 ASSAY OF PROTEIN SERUM: CPT | Performed by: INTERNAL MEDICINE

## 2024-12-29 PROCEDURE — 85610 PROTHROMBIN TIME: CPT | Performed by: INTERNAL MEDICINE

## 2024-12-29 PROCEDURE — 83735 ASSAY OF MAGNESIUM: CPT | Performed by: NURSE PRACTITIONER

## 2024-12-29 RX ADMIN — CEFTRIAXONE SODIUM 2 G: 2 INJECTION, SOLUTION INTRAVENOUS at 21:47

## 2024-12-29 RX ADMIN — SPIRONOLACTONE 200 MG: 100 TABLET ORAL at 08:04

## 2024-12-29 RX ADMIN — LEVOTHYROXINE SODIUM 250 MCG: 0.1 TABLET ORAL at 07:29

## 2024-12-29 RX ADMIN — RIFAXIMIN 550 MG: 550 TABLET ORAL at 08:04

## 2024-12-29 RX ADMIN — CARVEDILOL 3.12 MG: 3.12 TABLET, FILM COATED ORAL at 08:05

## 2024-12-29 RX ADMIN — EMPAGLIFLOZIN 25 MG: 25 TABLET, FILM COATED ORAL at 08:05

## 2024-12-29 RX ADMIN — LACTULOSE 20 G: 10 SOLUTION ORAL at 08:06

## 2024-12-29 RX ADMIN — METFORMIN ER 500 MG 1000 MG: 500 TABLET ORAL at 08:04

## 2024-12-29 RX ADMIN — RIFAXIMIN 550 MG: 550 TABLET ORAL at 21:47

## 2024-12-29 RX ADMIN — Medication 1500 MG: at 12:31

## 2024-12-29 RX ADMIN — FUROSEMIDE 80 MG: 40 TABLET ORAL at 08:05

## 2024-12-29 RX ADMIN — METFORMIN ER 500 MG 1000 MG: 500 TABLET ORAL at 21:47

## 2024-12-29 RX ADMIN — PANTOPRAZOLE SODIUM 40 MG: 40 TABLET, DELAYED RELEASE ORAL at 07:29

## 2024-12-29 RX ADMIN — Medication 1500 MG: at 23:59

## 2024-12-29 RX ADMIN — CARVEDILOL 3.12 MG: 3.12 TABLET, FILM COATED ORAL at 16:11

## 2024-12-29 RX ADMIN — INSULIN ASPART 1 UNITS: 100 INJECTION, SOLUTION INTRAVENOUS; SUBCUTANEOUS at 18:36

## 2024-12-29 RX ADMIN — PANTOPRAZOLE SODIUM 40 MG: 40 TABLET, DELAYED RELEASE ORAL at 16:11

## 2024-12-29 ASSESSMENT — ACTIVITIES OF DAILY LIVING (ADL)
ADLS_ACUITY_SCORE: 18
ADLS_ACUITY_SCORE: 32
ADLS_ACUITY_SCORE: 32
ADLS_ACUITY_SCORE: 18
ADLS_ACUITY_SCORE: 32
ADLS_ACUITY_SCORE: 18

## 2024-12-29 NOTE — PLAN OF CARE
Goal Outcome Evaluation:      Plan of Care Reviewed With: patient, spouse    Overall Patient Progress: improving    Alert and oriented. Denies pain this shift. Redness receding from borders on right thigh, rash still present on upper right thigh. IV vancomycin and rocephin. Vitals and assessments as charted. Uses call light appropriately.     Face to face report given with opportunity to observe patient.    Report given to Teresa Valverde RN   12/29/2024  7:01 PM

## 2024-12-29 NOTE — PHARMACY-VANCOMYCIN DOSING SERVICE
Pharmacy Vancomycin Note  Date of Service 2024  Patient's  1980   44 year old, male    Indication: Skin and Soft Tissue Infection  Day of Therapy: 3  Current vancomycin regimen:  1500 mg IV q12h  Current vancomycin monitoring method: AUC  Current vancomycin therapeutic monitoring goal: 400-600 mg*h/L    [employer]ight[employer] Prediction of Current Vancomycin Regimen  Loading dose: N/A  Regimen: 1500 mg IV every 12 hours.  Start time: 00:31 on 2024  Exposure target: AUC24 (range)400-600 mg/L.hr   AUC24,ss: 522 mg/L.hr  Probability of AUC24 > 400: 87 %  Ctrough,ss: 13.3 mg/L  Probability of Ctrough,ss > 20: 24 %  Probability of nephrotoxicity (Lodise MARÍA ): 8 %      Current estimated CrCl = Estimated Creatinine Clearance: 180.1 mL/min (based on SCr of 0.74 mg/dL).    Creatinine for last 3 days  2024:  8:17 PM Creatinine 0.87 mg/dL  2024:  7:23 AM Creatinine 0.75 mg/dL  2024:  5:17 AM Creatinine 0.74 mg/dL    Recent Vancomycin Levels (past 3 days)  2024:  3:09 PM Vancomycin 26.9 ug/mL    Vancomycin IV Administrations (past 72 hours)                     vancomycin (VANCOCIN) 1,500 mg in 0.9% NaCl 250 mL intermittent infusion (mg) 1,500 mg New Bag 24 1231     1,500 mg New Bag 24 2237     1,500 mg New Bag  1041    vancomycin (VANCOCIN) 1,000 mg in 200 mL dextrose intermittent infusion (mg) 1,000 mg New Bag 24 0036    vancomycin (VANCOCIN) 1,500 mg in 0.9% NaCl 250 mL intermittent infusion (mg) 1,500 mg New Bag 24 2259                    Nephrotoxins and other renal medications (From now, onward)      Start     Dose/Rate Route Frequency Ordered Stop    24 1100  vancomycin (VANCOCIN) 1,500 mg in 0.9% NaCl 250 mL intermittent infusion         1,500 mg  166.7 mL/hr over 90 Minutes Intravenous EVERY 12 HOURS 24 0520      24 0900  empagliflozin (JARDIANCE) tablet 25 mg        Note to Pharmacy: PTA Sig:Take 25 mg by mouth      25 mg Oral DAILY  12/28/24 0829 12/28/24 0900  furosemide (LASIX) tablet 80 mg        Note to Pharmacy: PTA Sig:Take 80 mg by mouth daily      80 mg Oral DAILY 12/28/24 0829                 Contrast Orders - past 72 hours (72h ago, onward)      None            Interpretation of levels and current regimen:  Vancomycin level is reflective of -600    Has serum creatinine changed greater than 50% in last 72 hours: No    Urine output:  unable to determine    Renal Function: Improving    InsightRX Prediction of Planned New Vancomycin Regimen      Plan:  Continue Current Dose  Vancomycin monitoring method: AUC  Vancomycin therapeutic monitoring goal: 400-600 mg*h/L  Pharmacy will check vancomycin levels as appropriate in 1-3 Days.  Serum creatinine levels will be ordered daily for the first week of therapy and at least twice weekly for subsequent weeks.    Craig Mario RP

## 2024-12-29 NOTE — PROGRESS NOTES
[facility]    Hospitalist Progress Note    Date of Service (when I saw the patient): 12/29/2024    Assessment & Plan       Cellulitis of right lower extremity:Recurrent cellulitis-in the left leg usually.  Erythema and pain starting to improve. Continue Vanco and Rocephin-will plan for at least 48 hours of IV antibiotics depending on course.      Gram positive bacteremia: 3/4 bottles gram positive cocci. Repeat BC in the AM and follow.       Sepsis due to cellulitis (H): resolved. Lactic acid is normal, afebrile, mentation normal.       Septic encephalopathy: Resolved      Decompensated liver disease (H): Stable-LFTs improved from last night. Restart his home meds. Pressures have been stable this morning as well so will give his daily lasix dose as well      Hyperglycemia: Due to sepsis plus inadequately controlled at baseline since stopping jardiance. Did discuss with him yeast in the groin is unlikely due to the Jardiance unless he is chronically incontinent. He is willing to give it another try so did restart it.   -12/29: Improved today      Hyponatremia: Improved overnight, recheck in AM      Type 2 diabetes mellitus without complication, without long-term current use of insulin (H): As above, Also continue metformin and add sliding scale.        # Hyponatremia: Lowest Na = 129 mmol/L in last 2 days, will monitor as appropriate    # Hypercalcemia: corrected calcium is >10.1, will monitor as appropriate  # Hypomagnesemia: Lowest Mg = 1.5 mg/dL in last 2 days, will replace as needed   # Hypoalbuminemia: Lowest albumin = 2.3 g/dL at 12/28/2024  7:23 AM, will monitor as appropriate  # Coagulation Defect: INR = 1.47 (Ref range: 0.85 - 1.15) and/or PTT = N/A, will monitor for bleeding  # Thrombocytopenia: Lowest platelets = 55 in last 2 days, will monitor for bleeding  # DMII: A1C = 8.3 % (Ref range: <5.7 %) within past 6 months, PRESENT ON ADMISSION  # Severe Obesity: Estimated body mass index is 42.12  "kg/m  as calculated from the following:    Height as of this encounter: 1.829 m (6' 0.01\").    Weight as of this encounter: 140.9 kg (310 lb 10.1 oz)., PRESENT ON ADMISSION         DVT Prophylaxis: Low Risk/Ambulatory with no VTE prophylaxis indicated  Code Status: Full Code    Disposition: Expected discharge in 2 days once infection markers stable.    Lanie Palmer, CNP    Interval History   Right leg painful but improved, denies chest pain, dyspnea, abdominal pain or nausea    -Data reviewed today: I reviewed all new labs and imaging results over the last 24 hours.     Physical Exam   Temp: 97.6  F (36.4  C) Temp src: Tympanic BP: 127/64 Pulse: 92   Resp: 16 SpO2: 97 % O2 Device: None (Room air)    Vitals:    12/28/24 0458 12/29/24 0608   Weight: 140.9 kg (310 lb 10.1 oz) 138.9 kg (306 lb 3.5 oz)     Vital Signs with Ranges  Temp:  [97.3  F (36.3  C)-98.8  F (37.1  C)] 97.6  F (36.4  C)  Pulse:  [79-93] 92  Resp:  [16-18] 16  BP: (115-135)/(47-64) 127/64  SpO2:  [95 %-98 %] 97 %  I/O last 3 completed shifts:  In: 1000 [P.O.:1000]  Out: 3800 [Urine:3800]    Peripheral IV 12/27/24 Anterior;Left Upper forearm (Active)   Site Assessment WDL 12/29/24 0802   Line Status Saline locked 12/29/24 0802   Dressing Transparent 12/29/24 0802   Dressing Status clean;dry;intact 12/29/24 0802   Line Intervention Flushed 12/29/24 0600   Phlebitis Scale 0-->no symptoms 12/29/24 0802   Infiltration? no 12/29/24 0802   Number of days: 2     Line/device assessment(s) completed for medical necessity    Constitutional: Awake,alert, no acute distress  Respiratory: Clear bilaterally, no crackles, wheezes or rhonchi  Cardiovascular: HRR, no murmurs, rubs,thrills.   GI: Soft,nontender, bowel sounds hyperactive   Skin/Integumen: Irregular patchy redness noted to the right thigh above the knee, circumferential areas are noted to be much improved,shrunk from borders and just barely pink. Extends up into the midline thigh.   Other:  "     Medications   Current Facility-Administered Medications   Medication Dose Route Frequency Provider Last Rate Last Admin     Current Facility-Administered Medications   Medication Dose Route Frequency Provider Last Rate Last Admin    carvedilol (COREG) tablet 3.125 mg  3.125 mg Oral BID w/meals Lanie Palmer NP   3.125 mg at 12/29/24 0805    cefTRIAXone IN D5W (ROCEPHIN) intermittent infusion 2 g  2 g Intravenous Q24H Lanie Palmer  mL/hr at 12/28/24 2200 2 g at 12/28/24 2200    empagliflozin (JARDIANCE) tablet 25 mg  25 mg Oral Daily Lanie Palmer, NP   25 mg at 12/29/24 0805    furosemide (LASIX) tablet 80 mg  80 mg Oral Daily Lanie Palmer NP   80 mg at 12/29/24 0805    insulin aspart (NovoLOG) injection (RAPID ACTING)  1-7 Units Subcutaneous TID  Alvaro Lancaster MD   3 Units at 12/28/24 1812    insulin aspart (NovoLOG) injection (RAPID ACTING)  1-5 Units Subcutaneous At Bedtime Alvaro Lancaster MD   2 Units at 12/28/24 2207    lactulose (CHRONULAC) solution 20 g  20 g Oral Daily Lanie Palmer NP   20 g at 12/29/24 0806    levothyroxine (SYNTHROID/LEVOTHROID) tablet 250 mcg  250 mcg Oral QAM  Lanie Palmer NP   250 mcg at 12/29/24 0729    metFORMIN (GLUCOPHAGE XR) 24 hr tablet 1,000 mg  1,000 mg Oral BID Lanie Palmer NP   1,000 mg at 12/29/24 0804    pantoprazole (PROTONIX) EC tablet 40 mg  40 mg Oral BID  Lanie Palmer NP   40 mg at 12/29/24 0729    rifaximin (XIFAXAN) tablet 550 mg  550 mg Oral BID Lanie Palmer NP   550 mg at 12/29/24 0804    sodium chloride (PF) 0.9% PF flush 3 mL  3 mL Intracatheter Q8H Alvaro Lancaster MD        sodium chloride (PF) 0.9% PF flush 3 mL  3 mL Intracatheter Q8H Alvaro Lancaster MD   3 mL at 12/28/24 2049    spironolactone (ALDACTONE) tablet 200 mg  200 mg Oral Daily Lanie Palmer NP   200 mg at 12/29/24 0804    vancomycin (VANCOCIN) 1,500 mg in 0.9% NaCl 250 mL intermittent infusion  1,500 mg  Intravenous Q12H Alvaro Lancaster .7 mL/hr at 12/28/24 2237 1,500 mg at 12/28/24 2237       Data   Recent Labs   Lab 12/29/24  0818 12/29/24  0517 12/29/24  0157 12/28/24  1252 12/28/24  0723 12/28/24  0523 12/27/24 2017   WBC  --  4.0  --   --   --  5.6 9.7   HGB  --  10.8*  --   --   --  11.0* 12.9*   MCV  --  90  --   --   --  89 89   PLT  --  53*  --   --   --  55* 73*   INR  --  1.47*  --   --   --   --   --    NA  --  135  --   --  132*  --  129*   POTASSIUM  --  4.0  --   --  3.7  3.6  --  3.5   CHLORIDE  --  102  --   --  103  --  98   CO2  --  24  --   --  20*  --  20*   BUN  --  17.8  --   --  17.6  --  16.3   CR  --  0.74  --   --  0.75  --  0.87   ANIONGAP  --  9  --   --  9  --  11   PEREZ  --  9.1  --   --  8.5*  --  9.6   * 134* 163*   < > 313* 325* 416*   ALBUMIN  --  2.2*  --   --  2.3*  --  2.9*   PROTTOTAL  --  5.0*  --   --  4.8*  --  5.7*   BILITOTAL  --  3.2*  --   --  4.0*  --  6.1*   ALKPHOS  --  143  --   --  152*  --  245*   ALT  --  36  --   --  38  --  48   AST  --  29  --   --  27  --  37    < > = values in this interval not displayed.       No results found for this or any previous visit (from the past 24 hours).

## 2024-12-29 NOTE — PHARMACY
[facility]    Pharmacy      Antimicrobial Stewardship Note     Current antimicrobial therapy:  Anti-infectives (From now, onward)      Start     Dose/Rate Route Frequency Ordered Stop    12/28/24 2200  cefTRIAXone IN D5W (ROCEPHIN) intermittent infusion 2 g         2 g  100 mL/hr over 30 Minutes Intravenous EVERY 24 HOURS 12/28/24 0840      12/28/24 1100  vancomycin (VANCOCIN) 1,500 mg in 0.9% NaCl 250 mL intermittent infusion         1,500 mg  166.7 mL/hr over 90 Minutes Intravenous EVERY 12 HOURS 12/28/24 0520      12/28/24 0900  rifaximin (XIFAXAN) tablet 550 mg        Note to Pharmacy: [facility] Sig:Take 550 mg by mouth 2 times daily      550 mg Oral 2 TIMES DAILY 12/28/24 0829              Indication: SSTI    Days of Therapy: 3     Pertinent labs:  Creatinine   Creatinine   Date Value Ref Range Status   12/29/2024 0.74 0.67 - 1.17 mg/dL Final   12/28/2024 0.75 0.67 - 1.17 mg/dL Final   12/27/2024 0.87 0.67 - 1.17 mg/dL Final   05/21/2020 0.83 0.70 - 1.20 mg/dL Final   03/12/2020 0.94 0.70 - 1.20 mg/dL Final   05/07/2018 0.80 0.70 - 1.20 mg/dL Final     WBC   WBC   Date Value Ref Range Status   01/11/2018 7.7 4.0 - 11.0 10e9/L Final     WBC Count   Date Value Ref Range Status   12/29/2024 4.0 4.0 - 11.0 10e3/uL Final   12/28/2024 5.6 4.0 - 11.0 10e3/uL Final   12/27/2024 9.7 4.0 - 11.0 10e3/uL Final     Procalcitonin   Procalcitonin   Date Value Ref Range Status   12/27/2024 3.91 (H) <0.50 ng/mL Final     Comment:     Interpretation and Recommendations     <0.5 ng/mL:   Systemic bacterial infection unlikely. Local bacterial infection is possible.     0.5-1.99 ng/mL:   Systemic bacterial infection possible, but various other conditions are known to induce PCT as well.     >=2.00 ng/mL:   Systemic bacterial infection likely, unless other causes are known.     Decision to start antibiotics should not be based on procalcitonin level alone. See Procalcitonin Guidance document for more details.  "https://[email or web address]/files/[facility]/documents/adult-procalcitonin-guidance-on-clshsjwzzvd59689.pdf    Factors that may affect PCT levels (not all-inclusive):     - Increased PCT level           Severe trauma/burns           Invasive surgery           Cooling therapy after cardiac arrest/surgery           Treatment with agents which stimulate cytokines           Acute kidney injury           Chronic kidney disease and end stage renal disease           Acute graft vs host disease           Non-specific shock causing decreased organ perfusion and/or infarction       - Normal or unchanged PCT level           Early in infections (if low and infection is suspected, repeating in 6-12 hours is recommended)           Chronic infections (endocarditis, osteomyelitis, prosthetic device/graft infections)           Localized infections (cellulitis, wound infections, intra-abdominal abscess)     Note: PCT has not been extensively studied in pregnancy/breastfeeding, pediatrics, severe immunosuppression, and cystic fibrosis.     CRP No results found for: \"CRP\"    Culture Results:      Recommendations/Interventions:  None    Craig Mario [facility]  December 29, 2024        "

## 2024-12-29 NOTE — PLAN OF CARE
Goal Outcome Evaluation:       Plan of Care Reviewed With: patient    Overall Patient Progress: progressing    Pt A&O, independent in room. VSS, afebrile. Tylenol given x1 for headache. LS clear. HRR. BS active. Adequate output of hortencia colored urine. Cellulitis to right thigh receding from borders, light pink. Compression stockings on. IV SL. IV Abx continue. Call light within reach.     Face to face report given with opportunity to observe patient.    Report given to ROULA Patel RN   12/29/2024  7:05 AM

## 2024-12-30 LAB
ALBUMIN SERPL BCG-MCNC: 2.4 G/DL (ref 3.5–5.2)
ALP SERPL-CCNC: 160 U/L (ref 40–150)
ALT SERPL W P-5'-P-CCNC: 36 U/L (ref 0–70)
ANION GAP SERPL CALCULATED.3IONS-SCNC: 12 MMOL/L (ref 7–15)
AST SERPL W P-5'-P-CCNC: 33 U/L (ref 0–45)
BACTERIA UR CULT: NORMAL
BILIRUB SERPL-MCNC: 2.8 MG/DL
BUN SERPL-MCNC: 14.5 MG/DL (ref 6–20)
CALCIUM SERPL-MCNC: 8.7 MG/DL (ref 8.8–10.4)
CHLORIDE SERPL-SCNC: 103 MMOL/L (ref 98–107)
CREAT SERPL-MCNC: 0.7 MG/DL (ref 0.67–1.17)
EGFRCR SERPLBLD CKD-EPI 2021: >90 ML/MIN/1.73M2
ERYTHROCYTE [DISTWIDTH] IN BLOOD BY AUTOMATED COUNT: 17.2 % (ref 10–15)
GLUCOSE BLDC GLUCOMTR-MCNC: 127 MG/DL (ref 70–99)
GLUCOSE BLDC GLUCOMTR-MCNC: 136 MG/DL (ref 70–99)
GLUCOSE BLDC GLUCOMTR-MCNC: 184 MG/DL (ref 70–99)
GLUCOSE BLDC GLUCOMTR-MCNC: 244 MG/DL (ref 70–99)
GLUCOSE BLDC GLUCOMTR-MCNC: 95 MG/DL (ref 70–99)
GLUCOSE SERPL-MCNC: 101 MG/DL (ref 70–99)
HCO3 SERPL-SCNC: 18 MMOL/L (ref 22–29)
HCT VFR BLD AUTO: 31.5 % (ref 40–53)
HGB BLD-MCNC: 11.1 G/DL (ref 13.3–17.7)
MAGNESIUM SERPL-MCNC: 1.7 MG/DL (ref 1.7–2.3)
MCH RBC QN AUTO: 31 PG (ref 26.5–33)
MCHC RBC AUTO-ENTMCNC: 35.2 G/DL (ref 31.5–36.5)
MCV RBC AUTO: 88 FL (ref 78–100)
PLATELET # BLD AUTO: 60 10E3/UL (ref 150–450)
POTASSIUM SERPL-SCNC: 3.6 MMOL/L (ref 3.4–5.3)
PROT SERPL-MCNC: 5.1 G/DL (ref 6.4–8.3)
RBC # BLD AUTO: 3.58 10E6/UL (ref 4.4–5.9)
SODIUM SERPL-SCNC: 133 MMOL/L (ref 135–145)
WBC # BLD AUTO: 3.4 10E3/UL (ref 4–11)

## 2024-12-30 PROCEDURE — 120N000001 HC R&B MED SURG/OB

## 2024-12-30 PROCEDURE — 250N000011 HC RX IP 250 OP 636: Performed by: INTERNAL MEDICINE

## 2024-12-30 PROCEDURE — 99232 SBSQ HOSP IP/OBS MODERATE 35: CPT | Performed by: NURSE PRACTITIONER

## 2024-12-30 PROCEDURE — 85014 HEMATOCRIT: CPT | Performed by: NURSE PRACTITIONER

## 2024-12-30 PROCEDURE — 85048 AUTOMATED LEUKOCYTE COUNT: CPT | Performed by: NURSE PRACTITIONER

## 2024-12-30 PROCEDURE — 250N000013 HC RX MED GY IP 250 OP 250 PS 637: Performed by: NURSE PRACTITIONER

## 2024-12-30 PROCEDURE — 83735 ASSAY OF MAGNESIUM: CPT | Performed by: NURSE PRACTITIONER

## 2024-12-30 PROCEDURE — 250N000011 HC RX IP 250 OP 636: Performed by: NURSE PRACTITIONER

## 2024-12-30 PROCEDURE — 80053 COMPREHEN METABOLIC PANEL: CPT | Performed by: NURSE PRACTITIONER

## 2024-12-30 PROCEDURE — 87040 BLOOD CULTURE FOR BACTERIA: CPT | Performed by: NURSE PRACTITIONER

## 2024-12-30 PROCEDURE — 36415 COLL VENOUS BLD VENIPUNCTURE: CPT | Performed by: NURSE PRACTITIONER

## 2024-12-30 RX ORDER — LEVOTHYROXINE SODIUM 125 UG/1
125 TABLET ORAL WEEKLY
COMMUNITY

## 2024-12-30 RX ADMIN — EMPAGLIFLOZIN 25 MG: 25 TABLET, FILM COATED ORAL at 08:06

## 2024-12-30 RX ADMIN — RIFAXIMIN 550 MG: 550 TABLET ORAL at 20:47

## 2024-12-30 RX ADMIN — Medication 1500 MG: at 11:30

## 2024-12-30 RX ADMIN — INSULIN ASPART 1 UNITS: 100 INJECTION, SOLUTION INTRAVENOUS; SUBCUTANEOUS at 18:38

## 2024-12-30 RX ADMIN — PANTOPRAZOLE SODIUM 40 MG: 40 TABLET, DELAYED RELEASE ORAL at 16:23

## 2024-12-30 RX ADMIN — FUROSEMIDE 80 MG: 40 TABLET ORAL at 08:06

## 2024-12-30 RX ADMIN — LACTULOSE 20 G: 10 SOLUTION ORAL at 08:04

## 2024-12-30 RX ADMIN — LEVOTHYROXINE SODIUM 250 MCG: 0.1 TABLET ORAL at 07:30

## 2024-12-30 RX ADMIN — CEFTRIAXONE SODIUM 2 G: 2 INJECTION, SOLUTION INTRAVENOUS at 21:57

## 2024-12-30 RX ADMIN — CARVEDILOL 3.12 MG: 3.12 TABLET, FILM COATED ORAL at 08:06

## 2024-12-30 RX ADMIN — Medication 1500 MG: at 22:43

## 2024-12-30 RX ADMIN — METFORMIN ER 500 MG 1000 MG: 500 TABLET ORAL at 08:05

## 2024-12-30 RX ADMIN — PANTOPRAZOLE SODIUM 40 MG: 40 TABLET, DELAYED RELEASE ORAL at 07:30

## 2024-12-30 RX ADMIN — RIFAXIMIN 550 MG: 550 TABLET ORAL at 08:04

## 2024-12-30 RX ADMIN — METFORMIN ER 500 MG 1000 MG: 500 TABLET ORAL at 20:47

## 2024-12-30 RX ADMIN — SPIRONOLACTONE 200 MG: 100 TABLET ORAL at 08:05

## 2024-12-30 RX ADMIN — CARVEDILOL 3.12 MG: 3.12 TABLET, FILM COATED ORAL at 16:23

## 2024-12-30 ASSESSMENT — ACTIVITIES OF DAILY LIVING (ADL)
ADLS_ACUITY_SCORE: 32

## 2024-12-30 NOTE — PHARMACY-MEDICATION REGIMEN REVIEW
Pharmacy Antimicrobial Stewardship Assessment     Current Antimicrobial Therapy:  Anti-infectives (From now, onward)      Start     Dose/Rate Route Frequency Ordered Stop    24 2200  cefTRIAXone IN D5W (ROCEPHIN) intermittent infusion 2 g         2 g  100 mL/hr over 30 Minutes Intravenous EVERY 24 HOURS 24 0840      24 1100  vancomycin (VANCOCIN) 1,500 mg in 0.9% NaCl 250 mL intermittent infusion         1,500 mg  166.7 mL/hr over 90 Minutes Intravenous EVERY 12 HOURS 24 0520      24 0900  rifaximin (XIFAXAN) tablet 550 mg        Note to Pharmacy: PTA Sig:Take 550 mg by mouth 2 times daily      550 mg Oral 2 TIMES DAILY 24 0829              Indication: SSTI (Ceftriaxone, Vancomycin), DUMONT (Rifaximin - chronic home med)     Days of Therapy:        Pertinent Labs:    Recent Labs   Lab Test 24  0640 24  0517 24  0523 24  1421 18  1725   WBC 3.4* 4.0 5.6 9.7 8.4 7.7       Recent Labs   Lab Test 24  0723 24  0722 24  0523 24  0039 24   LACT  --  1.9 2.4*   < > 4.5*   CRPI 76.77*  --   --   --  74.30*   PCAL  --   --   --   --  3.91*    < > = values in this interval not displayed.        Temperature:  Temp (24hrs), Av.9  F (36.6  C), Min:97.5  F (36.4  C), Max:98.2  F (36.8  C)      Culture Results:   30-Day Micro Results       Collected Updated Procedure Result Status      2024 0647 2024 0647 Blood Culture Arm, Right [[id number]]   Blood from Arm, Right    In process Component Value   No component results               2024 0558 2024 0601 Blood Culture Peripheral Blood [[id number]]   Peripheral Blood    In process Component Value   No component results               2024 2327 2024 0753 Urine Culture [[id number]]   Urine, Midstream    Final result Component Value   Culture <10,000 CFU/mL Urogenital alfonso               2024 2210 2024 1415 Blood  Culture Peripheral Blood [[id number]]   (Abnormal)   Peripheral Blood    Preliminary result Component Value   Culture Gram positive cocci in chains  [P]     1 of 2 bottles                 12/27/2024 2150 12/28/2024 1415 Blood Culture Peripheral Blood [[id number]]   (Abnormal)   Peripheral Blood    Preliminary result Component Value   Culture Gram positive cocci in chains  [P]     2 of 2 bottles                   12/27/2024 2137 12/27/2024 2219 Influenza A/B, RSV and SARS-CoV2 PCR (COVID-19) Nasopharyngeal [[id number]]    Swab from Nasopharyngeal    Final result Component Value   Influenza A PCR Negative   Influenza B PCR Negative   RSV PCR Negative   SARS CoV2 PCR Negative   NEGATIVE: SARS-CoV-2 (COVID-19) RNA not detected, presumed negative.                     Recommendations/Interventions:  Waiting for ID and susceptibility of Gram positive cocci in chains. No recommendations at this time. Will continue to monitor.     Kassandra Dobson RPH  December 30, 2024

## 2024-12-30 NOTE — MEDICATION SCRIBE - ADMISSION MEDICATION HISTORY
"Medication Scribe Admission Medication History    Admission medication history is complete. The information provided in this note is only as accurate as the sources available at the time of the update.    Information Source(s): Patient and CareEverywhere/SureScripts via in-person    Pertinent Information:   Patient manages his own medications- reliable historian.  patient, Care Everywhere reviewed, dispense hx checked.   Lactulose- reports he takes when he starts feeling symptoms. Does not have a daily regimen and reports he takes \"up to\" 30 mL at a time.     Changes made to PTA medication list:  Added: None  Deleted: jardiance (discontinued by patient per  11/20/24), hydroxyzine, duplicates  Changed:   Split synthroid into 2 entries (250 mcg Mon-Sat, 125 mcg Sun)  Input missing instructions  Updated time of day pt takes medications.     Allergies reviewed with patient and updates made in EHR: yes    Medication History Completed By: Deloris Hurst 12/30/2024 7:06 AM    PTA Med List   Medication Sig Last Dose/Taking    blood glucose monitoring (ONE TOUCH DELICA) lancets  Past Week    carvedilol (COREG) 3.125 MG tablet Take 3.125 mg by mouth 2 times daily (with meals) 12/28/2024 Morning    cholecalciferol 50 MCG (2000 UT) tablet Take 2,000 Units by mouth daily More than a month    Continuous Blood Gluc Sensor (FREESTYLE EARLINE 14 DAY SENSOR) MISC Inject 1 device under the skin every 14 days. 12/27/2024    furosemide (LASIX) 20 MG tablet Take 80 mg by mouth daily 12/27/2024    lactulose 20 GM/30ML solution Take 30 mLs by mouth 2 times daily. Titrate to have 2 to 3 soft stools per day. 12/28/2024 Morning    levothyroxine (SYNTHROID/LEVOTHROID) 125 MCG tablet Take 125 mcg by mouth once a week. (Sunday). 2 tablets (250 mcg) all other days. Taking    levothyroxine (SYNTHROID/LEVOTHROID) 125 MCG tablet Take 2 tablets by mouth five times a week. Monday- Saturday. Take 1 tablet (125 mcg) on Sunday. 12/28/2024 " Morning    metFORMIN (GLUCOPHAGE XR) 500 MG 24 hr tablet Take 1,000 mg by mouth 2 times daily 12/28/2024 Morning    omeprazole (PRILOSEC) 40 MG DR capsule Take 1 capsule by mouth every morning (before breakfast). 12/27/2024    rifaximin (XIFAXAN) 550 MG TABS tablet Take 550 mg by mouth 2 times daily 12/28/2024 Morning    spironolactone (ALDACTONE) 100 MG tablet Take 300 mg by mouth daily. 12/28/2024 Morning    vitamin E 400 UNIT capsule Take 800 Units by mouth daily 12/27/2024

## 2024-12-30 NOTE — PLAN OF CARE
Goal Outcome Evaluation:      Plan of Care Reviewed With: patient    Overall Patient Progress: improving    Alert and oriented. Denies pain this shift. Saline locked. IV vanco and rocephin. Vitals and assessments as charted. Redness improving on right thigh and continues to recede from marked borders. Up independently in room. Uses call light appropriately.     Face to face report given with opportunity to observe patient.    Report given to Inocencia Valverde RN   12/30/2024  7:17 PM

## 2024-12-30 NOTE — PROGRESS NOTES
[facility]    Hospitalist Progress Note    Date of Service (when I saw the patient): 12/30/2024    Assessment & Plan       Cellulitis of right lower extremity:Recurrent cellulitis-in the left leg usually.  Erythema and pain starting to improve. Continue Vanco and Rocephin-will plan for at least 48 hours of IV antibiotics depending on course.   -12/30: Repeat cultures drawn this morning. Cellulitis nearly resolved by exam.      Gram positive bacteremia: 3/4 bottles gram positive cocci. Repeat BC in the AM and follow.  -12/30: No ID yet       Sepsis due to cellulitis (H): resolved. Lactic acid is normal, afebrile, mentation normal.       Septic encephalopathy: Resolved      Decompensated liver disease (H): Stable-LFTs improved from last night. Restart his home meds. Pressures have been stable this morning as well so will give his daily lasix dose as well      Hyperglycemia: Due to sepsis plus inadequately controlled at baseline since stopping jardiance. Did discuss with him yeast in the groin is unlikely due to the Jardiance unless he is chronically incontinent. He is willing to give it another try so did restart it.   -12/29: Improved today  -12/30: Significant improvement adding back Jardiance      Hyponatremia: Improved overnight, recheck in AM  -12/30: Stable      Type 2 diabetes mellitus without complication, without long-term current use of insulin (H): As above, Also continue metformin and add sliding scale.        # Hyponatremia: Lowest Na = 129 mmol/L in last 2 days, will monitor as appropriate    # Hypercalcemia: corrected calcium is >10.1, will monitor as appropriate  # Hypomagnesemia: Lowest Mg = 1.5 mg/dL in last 2 days, will replace as needed   # Hypoalbuminemia: Lowest albumin = 2.3 g/dL at 12/28/2024  7:23 AM, will monitor as appropriate  # Coagulation Defect: INR = 1.47 (Ref range: 0.85 - 1.15) and/or PTT = N/A, will monitor for bleeding  # Thrombocytopenia: Lowest platelets = 55 in last 2  "days, will monitor for bleeding  # DMII: A1C = 8.3 % (Ref range: <5.7 %) within past 6 months, PRESENT ON ADMISSION  # Severe Obesity: Estimated body mass index is 42.12 kg/m  as calculated from the following:    Height as of this encounter: 1.829 m (6' 0.01\").    Weight as of this encounter: 140.9 kg (310 lb 10.1 oz)., PRESENT ON ADMISSION         DVT Prophylaxis: Low Risk/Ambulatory with no VTE prophylaxis indicated  Code Status: Full Code    Disposition: Expected discharge in 2 days once infection markers stable.    Lanie Palmer, CNP    Interval History   Right leg painful but improved, denies chest pain, dyspnea, abdominal pain or nausea    -Data reviewed today: I reviewed all new labs and imaging results over the last 24 hours.     Physical Exam   Temp: 97.5  F (36.4  C) Temp src: Tympanic BP: 121/62 Pulse: 81   Resp: 16 SpO2: 99 % O2 Device: None (Room air)    Vitals:    12/28/24 0458 12/29/24 0608   Weight: 140.9 kg (310 lb 10.1 oz) 138.9 kg (306 lb 3.5 oz)     Vital Signs with Ranges  Temp:  [97.5  F (36.4  C)-98.2  F (36.8  C)] 97.5  F (36.4  C)  Pulse:  [75-87] 81  Resp:  [16-18] 16  BP: (120-145)/(53-67) 121/62  SpO2:  [98 %-99 %] 99 %  I/O last 3 completed shifts:  In: 777 [P.O.:460; I.V.:317]  Out: 3475 [Urine:3475]    Peripheral IV 12/30/24 Left;Posterior Hand (Active)   Number of days: 0     Line/device assessment(s) completed for medical necessity    Constitutional: Awake,alert, no acute distress  Respiratory: Clear bilaterally, no crackles, wheezes or rhonchi  Cardiovascular: HRR, no murmurs, rubs,thrills.   GI: Soft,nontender, bowel sounds hyperactive   Skin/Integumen: Irregular patchy redness noted to the right thigh above the knee, circumferential areas are noted to be much improved,shrunk from borders and just barely pink. Extends up into the midline thigh.   Other:      Medications   Current Facility-Administered Medications   Medication Dose Route Frequency Provider Last Rate Last Admin "     Current Facility-Administered Medications   Medication Dose Route Frequency Provider Last Rate Last Admin    carvedilol (COREG) tablet 3.125 mg  3.125 mg Oral BID w/meals Lanie Palmer NP   3.125 mg at 12/30/24 0806    cefTRIAXone IN D5W (ROCEPHIN) intermittent infusion 2 g  2 g Intravenous Q24H Lanie Palmer R  mL/hr at 12/29/24 2147 2 g at 12/29/24 2147    empagliflozin (JARDIANCE) tablet 25 mg  25 mg Oral Daily Lanie Palmer R, NP   25 mg at 12/30/24 0806    furosemide (LASIX) tablet 80 mg  80 mg Oral Daily Lanie Palmer, NP   80 mg at 12/30/24 0806    insulin aspart (NovoLOG) injection (RAPID ACTING)  1-7 Units Subcutaneous TID  Alvaro Lancaster MD   1 Units at 12/29/24 1836    insulin aspart (NovoLOG) injection (RAPID ACTING)  1-5 Units Subcutaneous At Bedtime Alvaro Lancaster MD   2 Units at 12/28/24 2207    lactulose (CHRONULAC) solution 20 g  20 g Oral Daily Lanie Palmer NP   20 g at 12/30/24 0804    levothyroxine (SYNTHROID/LEVOTHROID) tablet 250 mcg  250 mcg Oral QAM  Lanie Palmer NP   250 mcg at 12/30/24 0730    metFORMIN (GLUCOPHAGE XR) 24 hr tablet 1,000 mg  1,000 mg Oral BID Lanie Palmer NP   1,000 mg at 12/30/24 0805    pantoprazole (PROTONIX) EC tablet 40 mg  40 mg Oral BID  Lanie Palmer NP   40 mg at 12/30/24 0730    rifaximin (XIFAXAN) tablet 550 mg  550 mg Oral BID Lanie Palmer NP   550 mg at 12/30/24 0804    sodium chloride (PF) 0.9% PF flush 3 mL  3 mL Intracatheter Q8H Alvaro Lancaster MD        sodium chloride (PF) 0.9% PF flush 3 mL  3 mL Intracatheter Q8H Alvaro Lancaster MD   3 mL at 12/29/24 2147    spironolactone (ALDACTONE) tablet 200 mg  200 mg Oral Daily Lanie Palmer NP   200 mg at 12/30/24 0805    vancomycin (VANCOCIN) 1,500 mg in 0.9% NaCl 250 mL intermittent infusion  1,500 mg Intravenous Q12H Alvaro Lancaster .7 mL/hr at 12/29/24 2359 1,500 mg at 12/29/24 2359       Data   Recent Labs   Lab  12/30/24  0801 12/30/24  0640 12/30/24  0136 12/29/24  0818 12/29/24  0517 12/28/24  1252 12/28/24  0723 12/28/24  0523   WBC  --  3.4*  --   --  4.0  --   --  5.6   HGB  --  11.1*  --   --  10.8*  --   --  11.0*   MCV  --  88  --   --  90  --   --  89   PLT  --  60*  --   --  53*  --   --  55*   INR  --   --   --   --  1.47*  --   --   --    NA  --  133*  --   --  135  --  132*  --    POTASSIUM  --  3.6  --   --  4.0  --  3.7  3.6  --    CHLORIDE  --  103  --   --  102  --  103  --    CO2  --  18*  --   --  24  --  20*  --    BUN  --  14.5  --   --  17.8  --  17.6  --    CR  --  0.70  --   --  0.74  --  0.75  --    ANIONGAP  --  12  --   --  9  --  9  --    PEREZ  --  8.7*  --   --  9.1  --  8.5*  --    GLC 95 101* 136*   < > 134*   < > 313* 325*   ALBUMIN  --  2.4*  --   --  2.2*  --  2.3*  --    PROTTOTAL  --  5.1*  --   --  5.0*  --  4.8*  --    BILITOTAL  --  2.8*  --   --  3.2*  --  4.0*  --    ALKPHOS  --  160*  --   --  143  --  152*  --    ALT  --  36  --   --  36  --  38  --    AST  --  33  --   --  29  --  27  --     < > = values in this interval not displayed.       No results found for this or any previous visit (from the past 24 hours).

## 2024-12-30 NOTE — PROGRESS NOTES
Medical record and Jin Score reviewed. Participated in interdisciplinary rounds.  No apparent needs noted at this time. Care Transitions will remain available if needs arise.

## 2024-12-30 NOTE — PLAN OF CARE
Goal Outcome Evaluation:       Plan of Care Reviewed With: patient    Overall Patient Progress: progressing    Pt A&O, independent in room. VSS, afebrile. Denied pain. LS clear. HRR. BS active. Adequate output of orange urine. Cellulitis to right thigh receding from borders, pink in color. IV SL. IV Abx continue. Call light within reach. Wife in room overnight.    Face to face report given with opportunity to observe patient.    Report given to ROULA Patel RN   12/30/2024  7:00 AM

## 2024-12-31 VITALS
RESPIRATION RATE: 16 BRPM | HEART RATE: 80 BPM | DIASTOLIC BLOOD PRESSURE: 63 MMHG | OXYGEN SATURATION: 100 % | TEMPERATURE: 97.9 F | WEIGHT: 298.94 LBS | HEIGHT: 72 IN | SYSTOLIC BLOOD PRESSURE: 119 MMHG | BODY MASS INDEX: 40.49 KG/M2

## 2024-12-31 LAB
ANION GAP SERPL CALCULATED.3IONS-SCNC: 13 MMOL/L (ref 7–15)
BACTERIA BLD CULT: ABNORMAL
BACTERIA BLD CULT: ABNORMAL
BUN SERPL-MCNC: 12.2 MG/DL (ref 6–20)
CALCIUM SERPL-MCNC: 8.9 MG/DL (ref 8.8–10.4)
CHLORIDE SERPL-SCNC: 106 MMOL/L (ref 98–107)
CREAT SERPL-MCNC: 0.71 MG/DL (ref 0.67–1.17)
CRP SERPL-MCNC: 31.18 MG/L
EGFRCR SERPLBLD CKD-EPI 2021: >90 ML/MIN/1.73M2
ERYTHROCYTE [DISTWIDTH] IN BLOOD BY AUTOMATED COUNT: 17.3 % (ref 10–15)
GLUCOSE BLDC GLUCOMTR-MCNC: 140 MG/DL (ref 70–99)
GLUCOSE BLDC GLUCOMTR-MCNC: 148 MG/DL (ref 70–99)
GLUCOSE BLDC GLUCOMTR-MCNC: 178 MG/DL (ref 70–99)
GLUCOSE SERPL-MCNC: 114 MG/DL (ref 70–99)
HCO3 SERPL-SCNC: 19 MMOL/L (ref 22–29)
HCT VFR BLD AUTO: 37.7 % (ref 40–53)
HGB BLD-MCNC: 13 G/DL (ref 13.3–17.7)
MAGNESIUM SERPL-MCNC: 1.7 MG/DL (ref 1.7–2.3)
MCH RBC QN AUTO: 30.9 PG (ref 26.5–33)
MCHC RBC AUTO-ENTMCNC: 34.5 G/DL (ref 31.5–36.5)
MCV RBC AUTO: 90 FL (ref 78–100)
MRSA DNA SPEC QL NAA+PROBE: NEGATIVE
PLATELET # BLD AUTO: 82 10E3/UL (ref 150–450)
POTASSIUM SERPL-SCNC: 3.7 MMOL/L (ref 3.4–5.3)
PROCALCITONIN SERPL IA-MCNC: 1.03 NG/ML
RBC # BLD AUTO: 4.21 10E6/UL (ref 4.4–5.9)
SA TARGET DNA: NEGATIVE
SODIUM SERPL-SCNC: 138 MMOL/L (ref 135–145)
WBC # BLD AUTO: 3.9 10E3/UL (ref 4–11)

## 2024-12-31 PROCEDURE — 87641 MR-STAPH DNA AMP PROBE: CPT | Performed by: INTERNAL MEDICINE

## 2024-12-31 PROCEDURE — 86140 C-REACTIVE PROTEIN: CPT | Performed by: INTERNAL MEDICINE

## 2024-12-31 PROCEDURE — 250N000011 HC RX IP 250 OP 636: Performed by: INTERNAL MEDICINE

## 2024-12-31 PROCEDURE — 82310 ASSAY OF CALCIUM: CPT | Performed by: NURSE PRACTITIONER

## 2024-12-31 PROCEDURE — 250N000013 HC RX MED GY IP 250 OP 250 PS 637: Performed by: NURSE PRACTITIONER

## 2024-12-31 PROCEDURE — 87640 STAPH A DNA AMP PROBE: CPT | Performed by: INTERNAL MEDICINE

## 2024-12-31 PROCEDURE — 83735 ASSAY OF MAGNESIUM: CPT | Performed by: NURSE PRACTITIONER

## 2024-12-31 PROCEDURE — 85018 HEMOGLOBIN: CPT | Performed by: NURSE PRACTITIONER

## 2024-12-31 PROCEDURE — 36415 COLL VENOUS BLD VENIPUNCTURE: CPT | Performed by: NURSE PRACTITIONER

## 2024-12-31 PROCEDURE — 99239 HOSP IP/OBS DSCHRG MGMT >30: CPT | Performed by: INTERNAL MEDICINE

## 2024-12-31 PROCEDURE — 84145 PROCALCITONIN (PCT): CPT | Performed by: INTERNAL MEDICINE

## 2024-12-31 PROCEDURE — 80048 BASIC METABOLIC PNL TOTAL CA: CPT | Performed by: NURSE PRACTITIONER

## 2024-12-31 PROCEDURE — 250N000013 HC RX MED GY IP 250 OP 250 PS 637: Performed by: INTERNAL MEDICINE

## 2024-12-31 RX ADMIN — EMPAGLIFLOZIN 25 MG: 25 TABLET, FILM COATED ORAL at 09:00

## 2024-12-31 RX ADMIN — LEVOTHYROXINE SODIUM 250 MCG: 0.1 TABLET ORAL at 08:10

## 2024-12-31 RX ADMIN — Medication 1500 MG: at 11:34

## 2024-12-31 RX ADMIN — INSULIN ASPART 1 UNITS: 100 INJECTION, SOLUTION INTRAVENOUS; SUBCUTANEOUS at 14:44

## 2024-12-31 RX ADMIN — METFORMIN ER 500 MG 1000 MG: 500 TABLET ORAL at 09:00

## 2024-12-31 RX ADMIN — LACTULOSE 20 G: 10 SOLUTION ORAL at 08:59

## 2024-12-31 RX ADMIN — RIFAXIMIN 550 MG: 550 TABLET ORAL at 09:00

## 2024-12-31 RX ADMIN — FUROSEMIDE 80 MG: 40 TABLET ORAL at 09:00

## 2024-12-31 RX ADMIN — INSULIN ASPART 1 UNITS: 100 INJECTION, SOLUTION INTRAVENOUS; SUBCUTANEOUS at 08:35

## 2024-12-31 RX ADMIN — PANTOPRAZOLE SODIUM 40 MG: 40 TABLET, DELAYED RELEASE ORAL at 08:11

## 2024-12-31 RX ADMIN — SPIRONOLACTONE 200 MG: 100 TABLET ORAL at 09:00

## 2024-12-31 RX ADMIN — AMOXICILLIN AND CLAVULANATE POTASSIUM 1 TABLET: 875; 125 TABLET, FILM COATED ORAL at 16:49

## 2024-12-31 RX ADMIN — PANTOPRAZOLE SODIUM 40 MG: 40 TABLET, DELAYED RELEASE ORAL at 16:49

## 2024-12-31 RX ADMIN — CARVEDILOL 3.12 MG: 3.12 TABLET, FILM COATED ORAL at 09:00

## 2024-12-31 RX ADMIN — CARVEDILOL 3.12 MG: 3.12 TABLET, FILM COATED ORAL at 16:49

## 2024-12-31 ASSESSMENT — ACTIVITIES OF DAILY LIVING (ADL)
ADLS_ACUITY_SCORE: 32

## 2024-12-31 NOTE — PROGRESS NOTES
[facility]    Hospitalist Progress Note    Jason Suarez is a 44 year old male who presented to the ED with confusion and fevers. He states that he started to have symptoms of confusion yesterday in the afternoon. He felt some pain in his right thigh along with a rash at the same He has some new erythema to his right medial thigh. His wife thought that the encephalopathy might be hepatic in origin, which he has had before, and tried to give extra doses (6 in total) of lactulose but he did not seem to improve. In the ED he was found to be febrile and was started on ceftriaxone and vancomycin.   When he came to the ER admission his temp was 102.4, blood pressure 128/69, heart rate 118, and sats were 96% on room air.  His lactic acid on admission was 4.5 procalcitonin is 3.91, CRP was 74.3 blood sugar was 416.  Transaminases were normal.  Bilirubin was 6.1.  White blood cell count was 9700, hemoglobin 12.9 and platelet count of 73,000.  He does have a significant history of cirrhosis secondary to steatohepatitis.  Does have a history of encephalopathy.  Is currently on Xifaxan and lactulose.    Assessment & Plan  Jason Suarez is a 44 year old male admitted on 12/27/2024. He presented to the ED with confusion and fevers. He was admitted for sepsis secondary to cellulitis.      Sepsis secondary to cellulitis/septic encephalopathy/positive blood cultures  He presented with confusion and fevers. Initially it was thought that it might be confusion from hepatic encephalopathy but his ammonia level is normal and his wife has given him mlutiple extra doses of lactulose. Given that he is also meeting sepsis criteria, this is most likely septic encephalopathy.   - admit to hospitalist service  - c/w ceftriaxone and vancomycin  - f/up blood cultures  - track borders of cellulitis  -12/31: Had positive blood cultures for Streptococcus mitis/oralis.  He had a second set of cultures done yesterday they are still  negative at this time.  He has had no fevers.  White count is 3900.  The susceptibilities were sent to the [facility].  We are awaiting those results hopefully back today.  To switch him to orals but need to make sure his second set of cultures remain negative.  His right leg looks actually excellent.  Minimal erythema is noted.  Feels quite good is rather anxious to be discharged.  His CRP is 31 procalcitonin 1.03.  Improved from his admission significantly.     Decompensated liver disease  DUMONT Cirrhosis  History of hepatic encephalopathy  He has DUMONT cirrhosis and appears to have some decompensation given his elevated bilirubin.   - trend CMP  - trend infection  - c/w lactulose and rifaximin  - c/w spironolactone  - hold furosemide for now  -12/31: Patient is on all of his usual cirrhosis medications at this time.  Blood pressures have been stable no signs of encephalopathy.  Minimal peripheral edema.     T2DM  Poorly controlled but he has no anion gap.   - hold metformin  - check HbA1c  - insulin sliding scale  -12/31: She is on Jardiance and metformin.  With some sliding scale coverage adequate values.  Blood sugars look good 114 this morning.     Hypothyroidism  - c/w levothyroxine     CAD  - c/w carvedilol, spironolactone  -12/31: Stable no issues.       Diet: Combination Diet High Consistent Carb (75 g CHO per Meal) Diet  Fluids: None    DVT Prophylaxis: Pneumatic Compression Devices  Code Status: Full Code    Disposition: Expected discharge as soon as culture results are back.  Ted Garces MD    Interval History   Patient feels quite good.  The erythema on his right thigh is basically gone.  He has been afebrile hemodynamically stable.  Awaiting the sensitivities from his positive blood culture.  There was sent down to [place].  He has been afebrile white counts normal procalcitonin and CRP all decreased.  Once we have the appropriate sensitivities back and the repeat cultures are negative  patient can be placed on orals and discharged home.    -Data reviewed today: I reviewed all new labs and imaging results over the last 24 hours. I personally reviewed no images or EKG's today.    Physical Exam   Temp: 98.9  F (37.2  C) Temp src: Tympanic BP: 128/71 Pulse: 80   Resp: 16 SpO2: 97 % O2 Device: None (Room air)    Vitals:    12/28/24 0458 12/29/24 0608 12/31/24 0537   Weight: 140.9 kg (310 lb 10.1 oz) 138.9 kg (306 lb 3.5 oz) 135.6 kg (298 lb 15.1 oz)     Vital Signs with Ranges  Temp:  [97.3  F (36.3  C)-98.9  F (37.2  C)] 98.9  F (37.2  C)  Pulse:  [76-90] 80  Resp:  [16-18] 16  BP: (113-128)/(46-71) 128/71  SpO2:  [96 %-99 %] 97 %  I/O last 3 completed shifts:  In: 2840 [P.O.:1680; I.V.:1160]  Out: 7625 [Urine:7625]    Peripheral IV 12/30/24 Left;Posterior Hand (Active)   Site Assessment WDL 12/30/24 2043   Line Status Saline locked 12/30/24 2043   Dressing Transparent 12/30/24 2043   Dressing Status clean;dry;intact 12/30/24 2043   Line Intervention Flushed 12/30/24 2043   Phlebitis Scale 0-->no symptoms 12/30/24 2043   Infiltration? no 12/30/24 2043   Number of days: 1       Rash 12/30/24 2321 Right anterior thigh other (see comments) (Active)   Distribution regional 12/31/24 0222   Color red 12/31/24 0222   Configuration/Shape pinpoint 12/31/24 0222   Borders diffuse;irregular 12/31/24 0222   Characteristics other (see comments) 12/31/24 0222   Lesion Size pinpoint 12/31/24 0222   Care, Rash open to air;other (see comments) 12/31/24 0222   Number of days: 1     No line/device    Constitutional: Alert and oriented x3. No distress    HEENT: Normocephalic/atraumatic, PERRL, EOMI, mouth moist, neck supple, no LN.     Cardiovascular: RRR.  No murmur, no  rubs, or gallops.   .  Pulses 2+    Respiratory: Clear to auscultation bilaterally    Abdomen: Soft, nontender, nondistended, no organomegaly. Bowel sounds present    Extremities: Warm/dry.  Trace to 1+ edema in the right upper thigh the area previously  been outlined there is no actual erythema anywhere noted on his thigh.  Has basically resolved.    Neuro:   Non focal, cranial nerves intact, Moves all extremities.  Medications   Current Facility-Administered Medications   Medication Dose Route Frequency Provider Last Rate Last Admin     Current Facility-Administered Medications   Medication Dose Route Frequency Provider Last Rate Last Admin    carvedilol (COREG) tablet 3.125 mg  3.125 mg Oral BID w/meals Lanie Palmer R, NP   3.125 mg at 12/30/24 1623    cefTRIAXone IN D5W (ROCEPHIN) intermittent infusion 2 g  2 g Intravenous Q24H Lanie Palmer R,  mL/hr at 12/30/24 2157 2 g at 12/30/24 2157    empagliflozin (JARDIANCE) tablet 25 mg  25 mg Oral Daily Lanie Palmer NP   25 mg at 12/30/24 0806    furosemide (LASIX) tablet 80 mg  80 mg Oral Daily Lanie Palmer, NP   80 mg at 12/30/24 0806    insulin aspart (NovoLOG) injection (RAPID ACTING)  1-7 Units Subcutaneous TID  Alvaro Lancaster MD   1 Units at 12/30/24 1838    insulin aspart (NovoLOG) injection (RAPID ACTING)  1-5 Units Subcutaneous At Bedtime Alvaro Lancaster MD   1 Units at 12/30/24 2157    lactulose (CHRONULAC) solution 20 g  20 g Oral Daily Lanie Palmer R, NP   20 g at 12/30/24 0804    levothyroxine (SYNTHROID/LEVOTHROID) tablet 250 mcg  250 mcg Oral QAM  Lanie Palmer NP   250 mcg at 12/30/24 0730    metFORMIN (GLUCOPHAGE XR) 24 hr tablet 1,000 mg  1,000 mg Oral BID Lanie Palmer R, NP   1,000 mg at 12/30/24 2047    pantoprazole (PROTONIX) EC tablet 40 mg  40 mg Oral BID  Lanie Palmer, NP   40 mg at 12/30/24 1623    rifaximin (XIFAXAN) tablet 550 mg  550 mg Oral BID Lanie Palmer R, NP   550 mg at 12/30/24 2047    sodium chloride (PF) 0.9% PF flush 3 mL  3 mL Intracatheter Q8H Alvaro Lancaster MD   3 mL at 12/31/24 0554    spironolactone (ALDACTONE) tablet 200 mg  200 mg Oral Daily Lanie Palmer NP   200 mg at 12/30/24 0805    vancomycin  (VANCOCIN) 1,500 mg in 0.9% NaCl 250 mL intermittent infusion  1,500 mg Intravenous Q12H Alvaro Lancaster .7 mL/hr at 12/30/24 2243 1,500 mg at 12/30/24 2243       Data   Recent Labs   Lab 12/31/24  0749 12/31/24  0550 12/31/24  0225 12/30/24  0801 12/30/24  0640 12/29/24  0818 12/29/24  0517   WBC  --  3.9*  --   --  3.4*  --  4.0   HGB  --  13.0*  --   --  11.1*  --  10.8*   MCV  --  90  --   --  88  --  90   PLT  --  82*  --   --  60*  --  53*   INR  --   --   --   --   --   --  1.47*   NA  --  138  --   --  133*  --  135   POTASSIUM  --  3.7  --   --  3.6  --  4.0   CHLORIDE  --  106  --   --  103  --  102   CO2  --  19*  --   --  18*  --  24   BUN  --  12.2  --   --  14.5  --  17.8   CR  --  0.71  --   --  0.70  --  0.74   ANIONGAP  --  13  --   --  12  --  9   PEREZ  --  8.9  --   --  8.7*  --  9.1   * 114* 140*   < > 101*   < > 134*   ALBUMIN  --   --   --   --  2.4*  --  2.2*   PROTTOTAL  --   --   --   --  5.1*  --  5.0*   BILITOTAL  --   --   --   --  2.8*  --  3.2*   ALKPHOS  --   --   --   --  160*  --  143   ALT  --   --   --   --  36  --  36   AST  --   --   --   --  33  --  29    < > = values in this interval not displayed.       No results found for this or any previous visit (from the past 24 hours).

## 2024-12-31 NOTE — PLAN OF CARE
Patient discharged at 5:18 PM via ambulation accompanied by spouse and staff. Prescriptions sent to patients preferred pharmacy. All belongings sent with patient.     Discharge instructions reviewed with patient and spouse.    Patient discharged to Home.   Report called to N/A    Surgical Patient   Surgical Procedures during stay: N/A  Did patient receive discharge instruction on wound care and recognition of infection symptoms? Yes    MISC  Follow up appointment made:  Yes  Home medications returned to patient: N/A  Patient reports pain was well managed at discharge: Yes

## 2024-12-31 NOTE — DISCHARGE INSTRUCTIONS
Appointments: * You have a Hospital Follow-up appointment scheduled for January 7th at 2:45 PM with Sunny Dumont at [facility] If you need to reschedule please call [phone]

## 2024-12-31 NOTE — DISCHARGE SUMMARY
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    Weight as of this encounter: 135.6 kg (298 lb 15.1 oz).       Follow-ups Needed After Discharge   Follow-up Appointments       Follow-up and recommended labs and tests       Follow up with primary care provider, Jesenia, within 7 days for hospital follow- up.  No follow up labs or test are needed.            None    Unresulted Labs Ordered in the Past 30 Days of this Admission       Date and Time Order Name Status Description    12/29/2024 11:00 PM Blood Culture Arm, Right Preliminary     12/29/2024 11:00 PM Blood Culture Peripheral Blood Preliminary         These results will be followed up by hospitalist    Discharge Disposition   Discharged to home  Condition at discharge: Stable    Hospital Course     Jason Suarez is a 44 year old male who presented to the ED with confusion and fevers. He states that he started to have symptoms of confusion yesterday in the afternoon. He felt some pain in his right thigh along with a rash at the same He has some new erythema to his right medial thigh. His wife thought that the encephalopathy might be hepatic in origin, which he has had before, and tried to give extra doses (6 in total) of lactulose but he did not seem to improve. In the ED he was found to be febrile and was started on ceftriaxone and vancomycin.   When he came to the ER admission his temp " was 102.4, blood pressure 128/69, heart rate 118, and sats were 96% on room air.  His lactic acid on admission was 4.5 procalcitonin is 3.91, CRP was 74.3 blood sugar was 416.  Transaminases were normal.  Bilirubin was 6.1.  White blood cell count was 9700, hemoglobin 12.9 and platelet count of 73,000.  He does have a significant history of cirrhosis secondary to steatohepatitis.  Does have a history of encephalopathy.  Is currently on Xifaxan and lactulose.     Assessment & Plan  Jason Suarez is a 44 year old male admitted on 12/27/2024. He presented to the ED with confusion and fevers. He was admitted for sepsis secondary to cellulitis.      Sepsis secondary to cellulitis/septic encephalopathy/positive blood cultures  He presented with confusion and fevers. Initially it was thought that it might be confusion from hepatic encephalopathy but his ammonia level is normal and his wife has given him mlutiple extra doses of lactulose. Given that he is also meeting sepsis criteria, this is most likely septic encephalopathy.   - admit to hospitalist service  - c/w ceftriaxone and vancomycin  - f/up blood cultures  - track borders of cellulitis  -12/31: Had positive blood cultures for Streptococcus mitis/oralis.  He had a second set of cultures done yesterday they are still negative at this time.  He has had no fevers.  White count is 3900.  The susceptibilities were sent to the [facility].  We are awaiting those results hopefully back today.  To switch him to orals but need to make sure his second set of cultures remain negative.  His right leg looks actually excellent.  Minimal erythema is noted.  Feels quite good is rather anxious to be discharged.  His CRP is 31 procalcitonin 1.03.  Improved from his admission significantly.  At this point patient remains very stable we discussed discharge prior to waiting for the 48 hours for cultures and he wishes to go home at this point.  Will send him out on the 7 days  of Augmentin.  Follow-up with primary care provider within the next 7 days also.  If he has any recurrent fevers chills sweats recurrence of his cellulitis does need to come back.  If he does have positive cultures results that we will call him regarding possible changes in his current plan.     Decompensated liver disease  DUMONT Cirrhosis  History of hepatic encephalopathy  He has DUMONT cirrhosis and appears to have some decompensation given his elevated bilirubin.   - trend CMP  - trend infection  - c/w lactulose and rifaximin  - c/w spironolactone  - hold furosemide for now  -12/31: Patient is on all of his usual cirrhosis medications at this time.  Blood pressures have been stable no signs of encephalopathy.  Minimal peripheral edema.  Patient is very alert appropriate no evidence of any encephalopathy.     T2DM  Poorly controlled but he has no anion gap.   - hold metformin  - check HbA1c  - insulin sliding scale  -12/31: She is on Jardiance and metformin.  With some sliding scale coverage adequate values.  Blood sugars look good 114 this morning.     Hypothyroidism  - c/w levothyroxine     CAD  - c/w carvedilol, spironolactone  -12/31: Stable no issues.    Consultations This Hospital Stay   PHARMACY TO DOSE VANCO  CARE MANAGEMENT / SOCIAL WORK IP CONSULT    Code Status   Full Code    Time Spent on this Encounter   I, Ted Garces MD, personally saw the patient today and spent greater than 30 minutes discharging this patient.       Ted Garces MD  [facility]  [facility] [address] [place] [id number]  Phone: [phone]  Fax: [phone]  ______________________________________________________________________    Physical Exam   Vital Signs: Temp: 98.9  F (37.2  C) Temp src: Tympanic BP: 125/71 Pulse: 80   Resp: 16 SpO2: 97 % O2 Device: None (Room air)    Weight: 298 lbs 15.1 oz  Constitutional: awake, alert, cooperative, no apparent distress, and appears stated age  ENT: Normocephalic, without  obvious abnormality, atraumatic, sinuses nontender on palpation, external ears without lesions, oral pharynx with moist mucous membranes, tonsils without erythema or exudates, gums normal and good dentition.  Respiratory: No increased work of breathing, good air exchange, clear to auscultation bilaterally, no crackles or wheezing  Cardiovascular: Normal apical impulse, regular rate and rhythm, normal S1 and S2, no S3 or S4, and no murmur noted  GI: No scars, normal bowel sounds, soft, non-distended, non-tender, no masses palpated, no hepatosplenomegally  Skin: Cheilitis of right lower extremity has completely resolved.  Musculoskeletal: 1+ lower extremity edema       Primary Care Physician   Sunny Dumont    Discharge Orders      Reason for your hospital stay    Patient was admitted with confusion and right lower extremity cellulitis.  Felt that he had septic encephalopathy.  Was initially placed on vancomycin and Rocephin.  Blood cultures did grow out Streptococcus mitis.  Subsequent cultures performed are negative to date.  It is pansensitive.  We switched him over to oral Augmentin.  For 7 more days.  He has been afebrile white counts normal hemodynamically stable.  The cellulitis resolved completely.  Should have follow-up within 1 week.  If he has any further fevers or issues should come back for evaluation.     Follow-up and recommended labs and tests     Follow up with primary care provider, Jesenia, within 7 days for hospital follow- up.  No follow up labs or test are needed.     Activity    Your activity upon discharge: activity as tolerated     Discharge Instructions    If patient develops any fevers, recurrent rash chills sweats should come back for evaluation     Diet    Follow this diet upon discharge: Current Diet:Orders Placed This Encounter      Combination Diet High Consistent Carb (75 g CHO per Meal) Diet       Significant Results and Procedures   Most Recent 3 CBC's:  Recent Labs   Lab Test  12/31/24  0550 12/30/24  0640 12/29/24  0517   WBC 3.9* 3.4* 4.0   HGB 13.0* 11.1* 10.8*   MCV 90 88 90   PLT 82* 60* 53*     Most Recent 3 BMP's:  Recent Labs   Lab Test 12/31/24  1441 12/31/24  0749 12/31/24  0550 12/30/24  0801 12/30/24  0640 12/29/24  0818 12/29/24  0517   NA  --   --  138  --  133*  --  135   POTASSIUM  --   --  3.7  --  3.6  --  4.0   CHLORIDE  --   --  106  --  103  --  102   CO2  --   --  19*  --  18*  --  24   BUN  --   --  12.2  --  14.5  --  17.8   CR  --   --  0.71  --  0.70  --  0.74   ANIONGAP  --   --  13  --  12  --  9   PEREZ  --   --  8.9  --  8.7*  --  9.1   * 148* 114*   < > 101*   < > 134*    < > = values in this interval not displayed.      Latest Reference Range & Units 12/27/24 20:17 12/31/24 05:50   Procalcitonin <0.50 ng/mL 3.91 (H) 1.03 (H)   (H): Data is abnormally high  Most Recent 2 LFT's:  Recent Labs   Lab Test 12/30/24  0640 12/29/24  0517   AST 33 29   ALT 36 36   ALKPHOS 160* 143   BILITOTAL 2.8* 3.2*      Latest Reference Range & Units 12/27/24 21:49   Ammonia 16 - 60 umol/L 33     7-Day Micro Results       Collected Updated Procedure Result Status      12/31/2024 1203 12/31/2024 1332 MRSA MSSA PCR, Nasal Swab [[id number]]    Swab from Nares, Bilateral    Final result Component Value   MRSA Target DNA Negative   SA Target DNA Negative            12/30/2024 0647 12/31/2024 0803 Blood Culture Arm, Right [[id number]]   Blood from Arm, Right    Preliminary result Component Value   Culture No growth after 1 day  [P]                12/30/2024 0558 12/31/2024 0803 Blood Culture Peripheral Blood [[id number]]   Peripheral Blood    Preliminary result Component Value   Culture No growth after 1 day  [P]                12/27/2024 2327 12/30/2024 0753 Urine Culture [[id number]]   Urine, Midstream    Final result Component Value   Culture <10,000 CFU/mL Urogenital alfonso               12/27/2024 2210 12/31/2024 1220 Blood Culture Peripheral Blood [[id number]]   "  (Abnormal)   Peripheral Blood    Final result Component Value   Culture Streptococcus mitis/oralis    1 of 2 bottles          Susceptibility        Streptococcus mitis/oralis      SHAR      Cefotaxime Susceptible      Ceftriaxone Susceptible      Clindamycin Susceptible      Meropenem Susceptible      Penicillin Susceptible      Vancomycin Susceptible                      Susceptibility Comments       Streptococcus mitis/oralis    Antibiotics listed as \"No Interpretation\" have no regulatory guidelines for susceptibility/resistance available.               12/27/2024 2150 12/31/2024 0806 Blood Culture Peripheral Blood [[id number]]   (Abnormal)   Peripheral Blood    Final result Component Value   Culture Streptococcus mitis/oralis    2 of 2 bottles      Susceptibilities done on previous cultures               12/27/2024 2137 12/27/2024 2219 Influenza A/B, RSV and SARS-CoV2 PCR (COVID-19) Nasopharyngeal [[id number]]    Swab from Nasopharyngeal    Final result Component Value   Influenza A PCR Negative   Influenza B PCR Negative   RSV PCR Negative   SARS CoV2 PCR Negative   NEGATIVE: SARS-CoV-2 (COVID-19) RNA not detected, presumed negative.                  Most Recent 6 glucoses:  Recent Labs   Lab Test 12/31/24  1441 12/31/24  0749 12/31/24  0550 12/31/24  0225 12/30/24  2156 12/30/24  1837   * 148* 114* 140* 244* 184*     Most Recent Urinalysis:  Recent Labs   Lab Test 12/27/24  2327   COLOR Orange*   APPEARANCE Clear   URINEGLC >1000*   URINEBILI Negative   URINEKETONE Trace*   SG 1.033   UBLD Negative   URINEPH 5.0   PROTEIN 20*   NITRITE Negative   LEUKEST Negative   RBCU 0   WBCU 3     Most Recent ESR & CRP:  Recent Labs   Lab Test 12/31/24  0550 12/28/24  0723 12/27/24  2149   SED  --   --  13   CRPI 31.18*   < >  --     < > = values in this interval not displayed.   ,   Results for orders placed or performed during the hospital encounter of 12/27/24   XR Chest Port 1 View    Narrative    EXAM: XR " CHEST PORT 1 VIEW  LOCATION: [facility]  DATE: 12/27/2024    INDICATION: Fever.  COMPARISON: None.      Impression    IMPRESSION: Cardiomediastinal silhouette within normal limits. No focal consolidation or pleural effusion.       Discharge Medications   Current Discharge Medication List        START taking these medications    Details   amoxicillin-clavulanate (AUGMENTIN) 875-125 MG tablet Take 1 tablet by mouth every 12 hours for 7 days.  Qty: 14 tablet, Refills: 0    Associated Diagnoses: Cellulitis of right lower extremity; Gram-positive bacteremia           CONTINUE these medications which have NOT CHANGED    Details   carvedilol (COREG) 3.125 MG tablet Take 3.125 mg by mouth 2 times daily (with meals)      cholecalciferol 50 MCG (2000 UT) tablet Take 2,000 Units by mouth at bedtime.      Continuous Blood Gluc Sensor (FREESTYLE EARLINE 14 DAY SENSOR) MISC Inject 1 device under the skin every 14 days.      furosemide (LASIX) 20 MG tablet Take 80 mg by mouth every morning.      lactulose 20 GM/30ML solution Take 30 mLs by mouth 2 times daily. Titrate to have 2 to 3 soft stools per day.      !! levothyroxine (SYNTHROID/LEVOTHROID) 125 MCG tablet Take 125 mcg by mouth once a week. (Sunday). 2 tablets (250 mcg) all other days.      !! levothyroxine (SYNTHROID/LEVOTHROID) 125 MCG tablet Take 2 tablets (250 mcg) by mouth Monday- Saturday. Take 1 tablet (125 mcg) on Sunday.      metFORMIN (GLUCOPHAGE XR) 500 MG 24 hr tablet Take 1,000 mg by mouth 2 times daily (with meals).      omeprazole (PRILOSEC) 40 MG DR capsule Take 1 capsule by mouth every morning (before breakfast).      rifaximin (XIFAXAN) 550 MG TABS tablet Take 550 mg by mouth 2 times daily      spironolactone (ALDACTONE) 100 MG tablet Take 300 mg by mouth every morning.      vitamin E 400 UNIT capsule Take 800 Units by mouth at bedtime.      zinc 50 MG TABS Take 1 tablet by mouth every morning.       !! - Potential duplicate medications found.  Please discuss with provider.        STOP taking these medications       empagliflozin (JARDIANCE) 25 MG TABS tablet Comments:   Reason for Stopping:         hydrOXYzine (VISTARIL) 25 MG capsule Comments:   Reason for Stopping:             Allergies   Allergies   Allergen Reactions    Clindamycin Diarrhea     C. Diff following in 2018

## 2024-12-31 NOTE — PLAN OF CARE
Goal Outcome Evaluation:      Plan of Care Reviewed With: patient    Overall Patient Progress: improving    Pt is A&O, independent, CHO diet. VSS, afebrile, room air, denies pain.  Assessment as charted.  Petechiae rash to right thigh is diffuse, denies pain/itchiness.  Edema remains, encouraged legs elevated.  BGs as charted.  Voiding well, good intake.  IV is SL,  flushes well.  IV Rocephin & Vanco continue, pt likes ice pack on hand after Vanco administration, no infiltration/irritation noted. K/Mg replacement not needed this date. Brakes locked, call light within reach, makes needs known.  Free from falls this shift, precautions remain in place.    Face to face report given with opportunity to observe patient.    Report given to ROULA Corcoran RN   12/31/2024  4:04 AM

## 2025-01-02 LAB
BACTERIA BLD CULT: NORMAL
BACTERIA BLD CULT: NORMAL

## 2025-01-04 LAB
BACTERIA BLD CULT: NO GROWTH
BACTERIA BLD CULT: NO GROWTH

## 2025-04-29 ENCOUNTER — APPOINTMENT (OUTPATIENT)
Dept: CT IMAGING | Facility: HOSPITAL | Age: 45
End: 2025-04-29
Attending: PHYSICIAN ASSISTANT
Payer: COMMERCIAL

## 2025-04-29 ENCOUNTER — HOSPITAL ENCOUNTER (EMERGENCY)
Facility: HOSPITAL | Age: 45
Discharge: HOME | End: 2025-04-29
Attending: PHYSICIAN ASSISTANT
Payer: COMMERCIAL

## 2025-04-29 VITALS
DIASTOLIC BLOOD PRESSURE: 68 MMHG | SYSTOLIC BLOOD PRESSURE: 125 MMHG | TEMPERATURE: 97.2 F | RESPIRATION RATE: 18 BRPM | OXYGEN SATURATION: 98 % | HEART RATE: 92 BPM

## 2025-04-29 DIAGNOSIS — D61.818 PANCYTOPENIA (H): ICD-10-CM

## 2025-04-29 DIAGNOSIS — E83.42 HYPOMAGNESEMIA: ICD-10-CM

## 2025-04-29 DIAGNOSIS — R79.89 INCREASED AMMONIA LEVEL: ICD-10-CM

## 2025-04-29 DIAGNOSIS — R41.0 EPISODE OF CONFUSION: ICD-10-CM

## 2025-04-29 DIAGNOSIS — E11.65 HYPERGLYCEMIA DUE TO DIABETES MELLITUS (H): ICD-10-CM

## 2025-04-29 LAB
ALBUMIN SERPL BCG-MCNC: 2.9 G/DL (ref 3.5–5.2)
ALBUMIN UR-MCNC: NEGATIVE MG/DL
ALP SERPL-CCNC: 317 U/L (ref 40–150)
ALT SERPL W P-5'-P-CCNC: 66 U/L (ref 0–70)
AMMONIA PLAS-SCNC: 65 UMOL/L (ref 16–60)
ANION GAP SERPL CALCULATED.3IONS-SCNC: 10 MMOL/L (ref 7–15)
APPEARANCE UR: CLEAR
AST SERPL W P-5'-P-CCNC: 43 U/L (ref 0–45)
BASOPHILS # BLD AUTO: 0 10E3/UL (ref 0–0.2)
BASOPHILS NFR BLD AUTO: 1 %
BILIRUB SERPL-MCNC: 3.3 MG/DL
BILIRUB UR QL STRIP: NEGATIVE
BUN SERPL-MCNC: 12.9 MG/DL (ref 6–20)
CALCIUM SERPL-MCNC: 9.2 MG/DL (ref 8.8–10.4)
CHLORIDE SERPL-SCNC: 105 MMOL/L (ref 98–107)
COLOR UR AUTO: ABNORMAL
CREAT SERPL-MCNC: 0.74 MG/DL (ref 0.67–1.17)
CRP SERPL-MCNC: <3 MG/L
EGFRCR SERPLBLD CKD-EPI 2021: >90 ML/MIN/1.73M2
EOSINOPHIL # BLD AUTO: 0.1 10E3/UL (ref 0–0.7)
EOSINOPHIL NFR BLD AUTO: 3 %
ERYTHROCYTE [DISTWIDTH] IN BLOOD BY AUTOMATED COUNT: 16.2 % (ref 10–15)
EST. AVERAGE GLUCOSE BLD GHB EST-MCNC: 209 MG/DL
GLUCOSE BLDC GLUCOMTR-MCNC: 372 MG/DL (ref 70–99)
GLUCOSE SERPL-MCNC: 467 MG/DL (ref 70–99)
GLUCOSE UR STRIP-MCNC: >1000 MG/DL
HBA1C MFR BLD: 8.9 %
HCO3 SERPL-SCNC: 20 MMOL/L (ref 22–29)
HCT VFR BLD AUTO: 36.1 % (ref 40–53)
HGB BLD-MCNC: 12.2 G/DL (ref 13.3–17.7)
HGB UR QL STRIP: NEGATIVE
HOLD SPECIMEN: NORMAL
IMM GRANULOCYTES # BLD: 0 10E3/UL
IMM GRANULOCYTES NFR BLD: 0 %
KETONES UR STRIP-MCNC: NEGATIVE MG/DL
LACTATE SERPL-SCNC: 2.3 MMOL/L (ref 0.7–2)
LACTATE SERPL-SCNC: 2.9 MMOL/L (ref 0.7–2)
LEUKOCYTE ESTERASE UR QL STRIP: NEGATIVE
LYMPHOCYTES # BLD AUTO: 0.4 10E3/UL (ref 0.8–5.3)
LYMPHOCYTES NFR BLD AUTO: 14 %
MAGNESIUM SERPL-MCNC: 1.6 MG/DL (ref 1.7–2.3)
MCH RBC QN AUTO: 27.7 PG (ref 26.5–33)
MCHC RBC AUTO-ENTMCNC: 33.8 G/DL (ref 31.5–36.5)
MCV RBC AUTO: 82 FL (ref 78–100)
MONOCYTES # BLD AUTO: 0.4 10E3/UL (ref 0–1.3)
MONOCYTES NFR BLD AUTO: 13 %
MUCOUS THREADS #/AREA URNS LPF: PRESENT /LPF
NEUTROPHILS # BLD AUTO: 2.1 10E3/UL (ref 1.6–8.3)
NEUTROPHILS NFR BLD AUTO: 69 %
NITRATE UR QL: NEGATIVE
NRBC # BLD AUTO: 0 10E3/UL
NRBC BLD AUTO-RTO: 0 /100
PH UR STRIP: 5 [PH] (ref 4.7–8)
PLATELET # BLD AUTO: 62 10E3/UL (ref 150–450)
POTASSIUM SERPL-SCNC: 4.4 MMOL/L (ref 3.4–5.3)
PROCALCITONIN SERPL IA-MCNC: 0.1 NG/ML
PROT SERPL-MCNC: 5.9 G/DL (ref 6.4–8.3)
RBC # BLD AUTO: 4.41 10E6/UL (ref 4.4–5.9)
RBC URINE: 0 /HPF
SODIUM SERPL-SCNC: 135 MMOL/L (ref 135–145)
SP GR UR STRIP: 1.01 (ref 1–1.03)
SQUAMOUS EPITHELIAL: 0 /HPF
T4 FREE SERPL-MCNC: 1.71 NG/DL (ref 0.9–1.7)
TSH SERPL DL<=0.005 MIU/L-ACNC: 0.14 UIU/ML (ref 0.3–4.2)
UROBILINOGEN UR STRIP-MCNC: NORMAL MG/DL
WBC # BLD AUTO: 3.1 10E3/UL (ref 4–11)
WBC URINE: 0 /HPF

## 2025-04-29 PROCEDURE — 82040 ASSAY OF SERUM ALBUMIN: CPT | Performed by: PHYSICIAN ASSISTANT

## 2025-04-29 PROCEDURE — 96365 THER/PROPH/DIAG IV INF INIT: CPT | Performed by: PHYSICIAN ASSISTANT

## 2025-04-29 PROCEDURE — 99285 EMERGENCY DEPT VISIT HI MDM: CPT | Mod: 25 | Performed by: PHYSICIAN ASSISTANT

## 2025-04-29 PROCEDURE — 87040 BLOOD CULTURE FOR BACTERIA: CPT | Performed by: PHYSICIAN ASSISTANT

## 2025-04-29 PROCEDURE — 81001 URINALYSIS AUTO W/SCOPE: CPT | Performed by: PHYSICIAN ASSISTANT

## 2025-04-29 PROCEDURE — 70450 CT HEAD/BRAIN W/O DYE: CPT | Mod: 26 | Performed by: STUDENT IN AN ORGANIZED HEALTH CARE EDUCATION/TRAINING PROGRAM

## 2025-04-29 PROCEDURE — 83605 ASSAY OF LACTIC ACID: CPT | Performed by: PHYSICIAN ASSISTANT

## 2025-04-29 PROCEDURE — 83036 HEMOGLOBIN GLYCOSYLATED A1C: CPT | Performed by: PHYSICIAN ASSISTANT

## 2025-04-29 PROCEDURE — 250N000011 HC RX IP 250 OP 636: Performed by: PHYSICIAN ASSISTANT

## 2025-04-29 PROCEDURE — 84439 ASSAY OF FREE THYROXINE: CPT | Performed by: PHYSICIAN ASSISTANT

## 2025-04-29 PROCEDURE — 250N000012 HC RX MED GY IP 250 OP 636 PS 637: Performed by: PHYSICIAN ASSISTANT

## 2025-04-29 PROCEDURE — 99284 EMERGENCY DEPT VISIT MOD MDM: CPT | Performed by: PHYSICIAN ASSISTANT

## 2025-04-29 PROCEDURE — 84443 ASSAY THYROID STIM HORMONE: CPT | Performed by: PHYSICIAN ASSISTANT

## 2025-04-29 PROCEDURE — 70450 CT HEAD/BRAIN W/O DYE: CPT

## 2025-04-29 PROCEDURE — 36415 COLL VENOUS BLD VENIPUNCTURE: CPT | Performed by: PHYSICIAN ASSISTANT

## 2025-04-29 PROCEDURE — 82962 GLUCOSE BLOOD TEST: CPT

## 2025-04-29 PROCEDURE — 258N000003 HC RX IP 258 OP 636: Performed by: PHYSICIAN ASSISTANT

## 2025-04-29 PROCEDURE — 86140 C-REACTIVE PROTEIN: CPT | Performed by: PHYSICIAN ASSISTANT

## 2025-04-29 PROCEDURE — 84145 PROCALCITONIN (PCT): CPT | Performed by: PHYSICIAN ASSISTANT

## 2025-04-29 PROCEDURE — 85025 COMPLETE CBC W/AUTO DIFF WBC: CPT | Performed by: PHYSICIAN ASSISTANT

## 2025-04-29 PROCEDURE — 250N000013 HC RX MED GY IP 250 OP 250 PS 637: Performed by: PHYSICIAN ASSISTANT

## 2025-04-29 PROCEDURE — 83735 ASSAY OF MAGNESIUM: CPT | Performed by: PHYSICIAN ASSISTANT

## 2025-04-29 PROCEDURE — 82140 ASSAY OF AMMONIA: CPT | Performed by: PHYSICIAN ASSISTANT

## 2025-04-29 RX ORDER — LACTULOSE 10 G/15ML
20 SOLUTION ORAL ONCE
Status: COMPLETED | OUTPATIENT
Start: 2025-04-29 | End: 2025-04-29

## 2025-04-29 RX ORDER — MAGNESIUM SULFATE HEPTAHYDRATE 40 MG/ML
2 INJECTION, SOLUTION INTRAVENOUS ONCE
Status: COMPLETED | OUTPATIENT
Start: 2025-04-29 | End: 2025-04-29

## 2025-04-29 RX ADMIN — INSULIN ASPART 15 UNITS: 100 INJECTION, SOLUTION INTRAVENOUS; SUBCUTANEOUS at 15:08

## 2025-04-29 RX ADMIN — SODIUM CHLORIDE 1000 ML: 9 INJECTION, SOLUTION INTRAVENOUS at 14:51

## 2025-04-29 RX ADMIN — LACTULOSE 20 G: 10 SOLUTION ORAL at 15:05

## 2025-04-29 RX ADMIN — MAGNESIUM SULFATE HEPTAHYDRATE 2 G: 40 INJECTION, SOLUTION INTRAVENOUS at 15:05

## 2025-04-29 ASSESSMENT — COLUMBIA-SUICIDE SEVERITY RATING SCALE - C-SSRS
1. IN THE PAST MONTH, HAVE YOU WISHED YOU WERE DEAD OR WISHED YOU COULD GO TO SLEEP AND NOT WAKE UP?: NO
2. HAVE YOU ACTUALLY HAD ANY THOUGHTS OF KILLING YOURSELF IN THE PAST MONTH?: NO
6. HAVE YOU EVER DONE ANYTHING, STARTED TO DO ANYTHING, OR PREPARED TO DO ANYTHING TO END YOUR LIFE?: NO

## 2025-04-29 ASSESSMENT — ENCOUNTER SYMPTOMS
CHILLS: 0
ABDOMINAL PAIN: 0
FEVER: 0
COUGH: 0
ACTIVITY CHANGE: 1
VOMITING: 0
SHORTNESS OF BREATH: 0

## 2025-04-29 ASSESSMENT — ACTIVITIES OF DAILY LIVING (ADL)
ADLS_ACUITY_SCORE: 58

## 2025-04-29 NOTE — DISCHARGE INSTRUCTIONS
It is very important that you return to this emergency department for any new or worsening symptoms or other questions or concerns.  Your symptoms are almost certainly multifactorial but do not appear to be secondary to an active infection at this time.  Very close clinic follow-up is important in the next 1 to 2 days.  It would be important to start taking your lactulose 2 or 3 times a day for the next few days as well.  Tight control of your blood sugar is also important.  Please return to this emergency department for any new or worsening symptoms or other questions or concerns whatsoever.

## 2025-04-29 NOTE — ED TRIAGE NOTES
"RN assessed patient in triage and determined patient not Urgent Care appropriate. Will be seen in Emergency Department.     Patient states he is having a \"bout of encephalopathy.\" Family states sx started last night, LKW 1600. A&Ox4, speech slow but answers appropriately. BEFAST negative. Had a fall last night out of bed, hit head on nightstand. Does report head and neck pain.   "

## 2025-04-29 NOTE — LETTER
April 29, 2025      To Whom It May Concern:      Jason Suarez was seen in our Emergency Department today, 04/29/25.  He should work from home tomorrow due to a medical condition.     Sincerely,              Lesley Flaherty PA-C

## 2025-04-29 NOTE — ED PROVIDER NOTES
History     Chief Complaint   Patient presents with    Altered Mental Status     The history is provided by the patient.     Jason Suarez is a 44 year old male who presented to the emergency department ambulatory for evaluation of intermittent confusion.  The patient has a long complicated past medical history most significant for recent hospitalization in December for gram-positive bacteremia with decompensated liver failure and encephalopathy.  He has a history of cirrhosis and is seen by the hepatologist at [facility] in [place] as well as [facility].  The remainder of his complicated and complex past medical history can be reviewed below.  He reports some increasing confusion but denies any fevers or abdominal pain.  No chest pains or cough.  Denies any unilateral weakness.    Allergies:  Allergies   Allergen Reactions    Clindamycin Diarrhea     C. Diff following in 2018       Problem List:    Patient Active Problem List    Diagnosis Date Noted    Gram-positive bacteremia 12/29/2024     Priority: Medium    Cellulitis of right lower extremity 12/28/2024     Priority: Medium    Septic encephalopathy 12/28/2024     Priority: Medium    Decompensated liver disease (H) 12/28/2024     Priority: Medium    Hyperglycemia 12/28/2024     Priority: Medium    Sepsis due to cellulitis (H) 12/28/2024     Priority: Medium    Hyponatremia 12/28/2024     Priority: Medium    Splenorenal shunt malfunction, subsequent encounter 08/26/2024     Priority: Medium    Hepatic encephalopathy (H) 08/26/2024     Priority: Medium    Morbid obesity (H) 10/22/2022     Priority: Medium    Esophageal varices without bleeding (H) 01/21/2022     Priority: Medium    Elevated urine levels of catecholamines 06/18/2021     Priority: Medium     Being followed at [facility] for it.      Alpha-1-antitrypsin deficiency (H) 08/12/2020     Priority: Medium     MZ      Routine general medical examination at a health care facility 07/18/2018      Priority: Medium    Esophageal reflux 01/11/2018     Priority: Medium    Type 2 diabetes mellitus without complication, without long-term current use of insulin (H) 06/29/2017     Priority: Medium    Dermatitis 06/26/2017     Priority: Medium    Screening for lipid disorders 06/26/2017     Priority: Medium    Cirrhosis, non-alcoholic (H) 01/06/2015     Priority: Medium    Micronodular cirrhosis (H) 07/10/2014     Priority: Medium    MEN 2A (multiple endocrine neoplasia, type 2A) (H) 04/07/2014     Priority: Medium    Hypothyroidism 01/16/2013     Priority: Medium    Coronary artery abnormality 05/25/2010     Priority: Medium    Family history of diabetes mellitus 05/25/2010     Priority: Medium    Fatty liver 05/25/2010     Priority: Medium    Polyp of gallbladder 05/25/2010     Priority: Medium    Genetic disorder 05/25/2010     Priority: Medium    Lymphadenopathy 05/25/2010     Priority: Medium    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 See scanned report      Family history of malignant neoplasm of thyroid 05/23/2010     Priority: Medium    Postoperative hypothyroidism 05/23/2010     Priority: Medium    Malignant neoplasm of thyroid gland (H) 03/29/2010     Priority: Medium    Obesity 03/29/2010     Priority: Medium        Past Medical History:    Past Medical History:   Diagnosis Date    Acute pancreatitis without infection or necrosis     Closed fracture of shaft of left humerus     Gastro-esophageal reflux disease without esophagitis     Malignant neoplasm of thyroid gland (H)     Multiple endocrine neoplasia (MEN) type IIA (H)     Obesity     Other fracture of left lower leg, initial encounter for closed fracture     Umbilical hernia without obstruction or gangrene        Past Surgical History:    Past Surgical History:    Procedure Laterality Date    CLOSED REDUCTION ANKLE      ORIF lt ankle    LAPAROSCOPIC CHOLECYSTECTOMY      7/10/14,Cholecystectomy,Laparoscopic, liver biopsy, [facility] without mesh    OTHER SURGICAL HISTORY      2066,REMOVE,and removal of syndesmotic screw.    THYROIDECTOMY             Family History:    Family History   Problem Relation Age of Onset    Cancer Father         Cancer,Thyroid cancer.       Social History:  Marital Status:   [2]  Social History     Tobacco Use    Smoking status: Former     Current packs/day: 0.00     Average packs/day: 1 pack/day for 12.0 years (12.0 ttl pk-yrs)     Types: Cigarettes     Start date: 2003     Quit date: 2015     Years since quittin.9    Smokeless tobacco: Never   Vaping Use    Vaping status: Never Used   Substance Use Topics    Alcohol use: No    Drug use: No     Comment: Drug use: No        Medications:    amoxicillin-clavulanate (AUGMENTIN) 875-125 MG tablet  carvedilol (COREG) 3.125 MG tablet  cholecalciferol 50 MCG ( UT) tablet  Continuous Blood Gluc Sensor (ClickHomeSTYLE EARLINE 14 DAY SENSOR) MISC  furosemide (LASIX) 20 MG tablet  lactulose 20 GM/30ML solution  levothyroxine (SYNTHROID/LEVOTHROID) 125 MCG tablet  levothyroxine (SYNTHROID/LEVOTHROID) 125 MCG tablet  metFORMIN (GLUCOPHAGE XR) 500 MG 24 hr tablet  omeprazole (PRILOSEC) 40 MG DR capsule  rifaximin (XIFAXAN) 550 MG TABS tablet  spironolactone (ALDACTONE) 100 MG tablet  vitamin E 400 UNIT capsule  zinc 50 MG TABS          Review of Systems   Constitutional:  Positive for activity change. Negative for chills and fever.   Respiratory:  Negative for cough and shortness of breath.    Cardiovascular:  Negative for chest pain.   Gastrointestinal:  Negative for abdominal pain and vomiting.   Genitourinary: Negative.    Allergic/Immunologic: Positive for immunocompromised state.   Neurological:         See HPI       Physical Exam   BP: 124/75  Pulse: 86  Temp: 97.2  F (36.2  C)  Resp:  18  SpO2: 99 %      Physical Exam  Vitals and nursing note reviewed.   Constitutional:       General: He is not in acute distress.     Appearance: Normal appearance. He is not ill-appearing, toxic-appearing or diaphoretic.      Comments: Pleasant and talkative 44-year-old male found semireclined in no distress   Cardiovascular:      Rate and Rhythm: Normal rate and regular rhythm.   Pulmonary:      Effort: Pulmonary effort is normal.      Comments: He has no tachypnea, increased work of breathing, or respiratory distress  Abdominal:      General: There is no distension.      Palpations: Abdomen is soft.      Tenderness: There is no abdominal tenderness. There is no guarding.   Musculoskeletal:      Comments: Examination of his lower extremities reveals no significant edema or evidence of cellulitis.   Skin:     General: Skin is warm and dry.      Capillary Refill: Capillary refill takes less than 2 seconds.   Neurological:      General: No focal deficit present.      Mental Status: He is alert and oriented to person, place, and time.         ED Course     ED Course as of 04/29/25 1656   Tue Apr 29, 2025   1408 Glucose Urine(!): >1000   1519 Procalcitonin: 0.10   1519 CRP Inflammation: <3.00     Procedures              Critical Care time:  none     IV Antibiotics given and/or elevated Lactate of 2.9 and no sepsis note found - Delete this reminder and enter the sepsis note or '.edcms' before signing chart.>>>None         Results for orders placed or performed during the hospital encounter of 04/29/25 (from the past 24 hours)   UA with Microscopic reflex to Culture    Specimen: Urine, Midstream   Result Value Ref Range    Color Urine Light Yellow Colorless, Straw, Light Yellow, Yellow    Appearance Urine Clear Clear    Glucose Urine >1000 (A) Negative mg/dL    Bilirubin Urine Negative Negative    Ketones Urine Negative Negative mg/dL    Specific Gravity Urine 1.010 1.003 - 1.035    Blood Urine Negative Negative    pH  Urine 5.0 4.7 - 8.0    Protein Albumin Urine Negative Negative mg/dL    Urobilinogen Urine Normal Normal mg/dL    Nitrite Urine Negative Negative    Leukocyte Esterase Urine Negative Negative    Mucus Urine Present (A) None Seen /LPF    RBC Urine 0 <=2 /HPF    WBC Urine 0 <=5 /HPF    Squamous Epithelials Urine 0 <=1 /HPF    Narrative    Urine Culture not indicated   CBC with platelets differential    Narrative    The following orders were created for panel order CBC with platelets differential.  Procedure                               Abnormality         Status                     ---------                               -----------         ------                     CBC with platelets and ...[[id number]]  Abnormal            Final result               RBC and Platelet Morpho...[[id number]]                                                   Please view results for these tests on the individual orders.   Comprehensive metabolic panel   Result Value Ref Range    Sodium 135 135 - 145 mmol/L    Potassium 4.4 3.4 - 5.3 mmol/L    Carbon Dioxide (CO2) 20 (L) 22 - 29 mmol/L    Anion Gap 10 7 - 15 mmol/L    Urea Nitrogen 12.9 6.0 - 20.0 mg/dL    Creatinine 0.74 0.67 - 1.17 mg/dL    GFR Estimate >90 >60 mL/min/1.73m2    Calcium 9.2 8.8 - 10.4 mg/dL    Chloride 105 98 - 107 mmol/L    Glucose 467 (H) 70 - 99 mg/dL    Alkaline Phosphatase 317 (H) 40 - 150 U/L    AST 43 0 - 45 U/L    ALT 66 0 - 70 U/L    Protein Total 5.9 (L) 6.4 - 8.3 g/dL    Albumin 2.9 (L) 3.5 - 5.2 g/dL    Bilirubin Total 3.3 (H) <=1.2 mg/dL   Lactic acid whole blood with 1x repeat in 2 hr when >2   Result Value Ref Range    Lactic Acid, Initial 2.9 (H) 0.7 - 2.0 mmol/L   Procalcitonin   Result Value Ref Range    Procalcitonin 0.10 <0.50 ng/mL   CRP inflammation   Result Value Ref Range    CRP Inflammation <3.00 <5.00 mg/L   Magnesium   Result Value Ref Range    Magnesium 1.6 (L) 1.7 - 2.3 mg/dL   TSH with free T4 reflex   Result Value Ref Range    TSH 0.14 (L)  0.30 - 4.20 uIU/mL   CBC with platelets and differential   Result Value Ref Range    WBC Count 3.1 (L) 4.0 - 11.0 10e3/uL    RBC Count 4.41 4.40 - 5.90 10e6/uL    Hemoglobin 12.2 (L) 13.3 - 17.7 g/dL    Hematocrit 36.1 (L) 40.0 - 53.0 %    MCV 82 78 - 100 fL    MCH 27.7 26.5 - 33.0 pg    MCHC 33.8 31.5 - 36.5 g/dL    RDW 16.2 (H) 10.0 - 15.0 %    Platelet Count 62 (L) 150 - 450 10e3/uL    % Neutrophils 69 %    % Lymphocytes 14 %    % Monocytes 13 %    % Eosinophils 3 %    % Basophils 1 %    % Immature Granulocytes 0 %    NRBCs per 100 WBC 0 <1 /100    Absolute Neutrophils 2.1 1.6 - 8.3 10e3/uL    Absolute Lymphocytes 0.4 (L) 0.8 - 5.3 10e3/uL    Absolute Monocytes 0.4 0.0 - 1.3 10e3/uL    Absolute Eosinophils 0.1 0.0 - 0.7 10e3/uL    Absolute Basophils 0.0 0.0 - 0.2 10e3/uL    Absolute Immature Granulocytes 0.0 <=0.4 10e3/uL    Absolute NRBCs 0.0 10e3/uL   T4 free   Result Value Ref Range    Free T4 1.71 (H) 0.90 - 1.70 ng/dL   Hemoglobin A1c   Result Value Ref Range    Estimated Average Glucose 209 (H) <117 mg/dL    Hemoglobin A1C 8.9 (H) <5.7 %   [facility] Draw    Narrative    The following orders were created for panel order [facility] Draw.  Procedure                               Abnormality         Status                     ---------                               -----------         ------                     Extra Blue Top Tube[[id number]]                             Final result               Extra Red Top Tube[[id number]]                              Final result                 Please view results for these tests on the individual orders.   Extra Blue Top Tube   Result Value Ref Range    Hold Specimen JIC    Extra Red Top Tube   Result Value Ref Range    Hold Specimen JIC    Ammonia   Result Value Ref Range    Ammonia 65 (H) 16 - 60 umol/L   CT Head w/o Contrast    Narrative    Exam: CT HEAD W/O CONTRAST      Exam reason: Confusion    Technique:   Axial images of the head obtained without contrast. Coronal  and  sagittal reformations were generated. This CT was performed using one  or more of the following dose reduction techniques: automated exposure  control, adjustment of the mA and/or kV according to patient size,  and/or use of iterative reconstruction technique.    Comparison: None.        Findings:      Parenchyma: No evidence of intraparenchymal hemorrhage, mass, acute  cortical infarct or prior infarct in a major vascular territory.      No midline shift. The basilar cisterns are patent.    Extra-axial spaces: No extra-axial fluid collection or hemorrhage.     Ventricles: Normal.  Paranasal sinuses: Clear.   Mastoid air cells: Clear.    Osseous: No acute findings.  Orbits: Normal.    Soft tissues: Unremarkable.       Impression    Impression:  No acute intracranial abnormality.      MARYLOU BOYD MD         SYSTEM ID:  [id number]   Lactic acid whole blood   Result Value Ref Range    Lactic Acid 2.3 (H) 0.7 - 2.0 mmol/L   Glucose by meter   Result Value Ref Range    GLUCOSE BY METER POCT 372 (H) 70 - 99 mg/dL       Medications   sodium chloride 0.9% BOLUS 1,000 mL (0 mLs Intravenous Stopped 4/29/25 1602)   magnesium sulfate 2 g in 50 mL sterile water intermittent infusion (0 g Intravenous Stopped 4/29/25 1602)   insulin aspart (NovoLOG) injection (RAPID ACTING) (15 Units Subcutaneous $Given 4/29/25 1508)   lactulose (CHRONULAC) solution 20 g (20 g Oral $Given 4/29/25 1505)       Assessments & Plan (with Medical Decision Making)   This is a chronically ill 44-year-old male who presented to the emergency department for evaluation of episodic confusion.  Not consistent with acute neurologic catastrophe.  Broad differential was considered.  Broad workup initiated in the emergency department to include a multitude of lab tests.  He has chronic pancytopenia.  His hypomagnesemia was repleted in the emergency department.  He was hydrated and his blood sugar was treated.  He reports improvement of his symptoms.  He has  not been taking his lactulose twice a day.  Mild increase in his ammonia.  Mild lactic acid elevation of unknown clinical significance given his hepatic history.  Procalcitonin is normal.  CRP is normal.  He has no evidence of cellulitis or other concerning features.  CT of his head is negative.  We had a long and exhaustive conversation regarding the importance of close clinic follow-up and the fact that I do not believe that antibiotics are required or in his best medical interest at this time.  Plan will be for close clinic follow-up in the next 24 to 48 hours for repeat examination and labs as well as strict return precautions for any fevers, worsening symptoms, new symptoms, or other questions or concerns.  Blood cultures are pending.  Given the fact he has no respiratory symptoms or abdominal pain there is no indication for emergent imaging.  Mr. Suarez voiced complete understanding and was quite happy and agreeable.  He had no further questions or concerns for me.    Prior to discharge the patient was happy and talkative and playing on his cell phone as well as showing me pictures from his cell phone.    There is no indication for further investigation or treatment on an emergent basis.  There is no reasonably foreseeable injury that would be associated with discharge and close outpatient follow-up.      This document was prepared using a combination of typing and voice generated software.  While every attempt was made for accuracy, spelling and grammatical errors may exist.     I have reviewed the nursing notes.    I have reviewed the findings, diagnosis, plan and need for follow up with the patient.           Medical Decision Making  The patient's presentation was of moderate complexity (a chronic illness mild to moderate exacerbation, progression, or side effect of treatment).    The patient's evaluation involved:  review of 3+ test result(s) ordered prior to this encounter (multiple previous labs)  strong  consideration of a test (CT abdomen and pelvis) that was ultimately deferred  ordering and/or review of 3+ test(s) in this encounter (multiple labs and CT scan)    The patient's management necessitated moderate risk (prescription drug management including medications given in the ED).        New Prescriptions    No medications on file       Final diagnoses:   Hyperglycemia due to diabetes mellitus (H)   Increased ammonia level   Hypomagnesemia   Pancytopenia (H)   Episode of confusion       4/29/2025   [facility]       Lesley Flaherty PA-C  04/29/25 1656       Lesley Flaherty PA-C  04/29/25 1702

## 2025-05-01 LAB
BACTERIA BLD CULT: NORMAL
BACTERIA BLD CULT: NORMAL

## 2025-05-05 LAB
BACTERIA BLD CULT: NO GROWTH
BACTERIA BLD CULT: NO GROWTH